# Patient Record
Sex: MALE | Race: BLACK OR AFRICAN AMERICAN | NOT HISPANIC OR LATINO | Employment: OTHER | URBAN - METROPOLITAN AREA
[De-identification: names, ages, dates, MRNs, and addresses within clinical notes are randomized per-mention and may not be internally consistent; named-entity substitution may affect disease eponyms.]

---

## 2018-08-11 ENCOUNTER — HOSPITAL ENCOUNTER (EMERGENCY)
Facility: HOSPITAL | Age: 68
Discharge: HOME | End: 2018-08-11
Attending: EMERGENCY MEDICINE
Payer: COMMERCIAL

## 2018-08-11 VITALS
WEIGHT: 158 LBS | RESPIRATION RATE: 16 BRPM | OXYGEN SATURATION: 99 % | TEMPERATURE: 96.2 F | HEIGHT: 66 IN | DIASTOLIC BLOOD PRESSURE: 96 MMHG | BODY MASS INDEX: 25.39 KG/M2 | HEART RATE: 55 BPM | SYSTOLIC BLOOD PRESSURE: 157 MMHG

## 2018-08-11 DIAGNOSIS — K02.9 PAIN DUE TO DENTAL CARIES: Primary | ICD-10-CM

## 2018-08-11 PROCEDURE — 99282 EMERGENCY DEPT VISIT SF MDM: CPT

## 2018-08-11 RX ORDER — PENICILLIN V POTASSIUM 500 MG/1
500 TABLET ORAL 4 TIMES DAILY
Qty: 40 TABLET | Refills: 0 | Status: SHIPPED | OUTPATIENT
Start: 2018-08-11 | End: 2018-08-21

## 2018-08-11 NOTE — ED NOTES
Pt evaluated, treated, and discharged solely by the advanced provider     Karma Perry RN  08/11/18 3947
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
## 2019-11-21 ENCOUNTER — HOSPITAL ENCOUNTER (EMERGENCY)
Facility: HOSPITAL | Age: 69
Discharge: HOME | End: 2019-11-21
Attending: EMERGENCY MEDICINE | Admitting: EMERGENCY MEDICINE
Payer: COMMERCIAL

## 2019-11-21 ENCOUNTER — APPOINTMENT (EMERGENCY)
Dept: RADIOLOGY | Facility: HOSPITAL | Age: 69
End: 2019-11-21
Payer: COMMERCIAL

## 2019-11-21 VITALS
SYSTOLIC BLOOD PRESSURE: 122 MMHG | BODY MASS INDEX: 26.84 KG/M2 | DIASTOLIC BLOOD PRESSURE: 71 MMHG | HEART RATE: 57 BPM | HEIGHT: 66 IN | WEIGHT: 167 LBS | RESPIRATION RATE: 17 BRPM | TEMPERATURE: 97.5 F | OXYGEN SATURATION: 98 %

## 2019-11-21 DIAGNOSIS — R07.89 ATYPICAL CHEST PAIN: Primary | ICD-10-CM

## 2019-11-21 LAB
ALBUMIN SERPL BCP-MCNC: 3.4 G/DL (ref 3.5–5)
ALP SERPL-CCNC: 60 U/L (ref 46–116)
ALT SERPL W P-5'-P-CCNC: 22 U/L (ref 12–78)
ANION GAP SERPL CALCULATED.3IONS-SCNC: 7 MMOL/L (ref 4–13)
AST SERPL W P-5'-P-CCNC: 20 U/L (ref 5–45)
ATRIAL RATE: 110 BPM
BASOPHILS # BLD AUTO: 0.03 THOUSANDS/ΜL (ref 0–0.1)
BASOPHILS NFR BLD AUTO: 1 % (ref 0–1)
BILIRUB SERPL-MCNC: 0.5 MG/DL (ref 0.2–1)
BUN SERPL-MCNC: 15 MG/DL (ref 5–25)
CALCIUM SERPL-MCNC: 9.1 MG/DL (ref 8.3–10.1)
CHLORIDE SERPL-SCNC: 108 MMOL/L (ref 100–108)
CO2 SERPL-SCNC: 26 MMOL/L (ref 21–32)
CREAT SERPL-MCNC: 1.39 MG/DL (ref 0.6–1.3)
EOSINOPHIL # BLD AUTO: 0.36 THOUSAND/ΜL (ref 0–0.61)
EOSINOPHIL NFR BLD AUTO: 10 % (ref 0–6)
ERYTHROCYTE [DISTWIDTH] IN BLOOD BY AUTOMATED COUNT: 12.7 % (ref 11.6–15.1)
GFR SERPL CREATININE-BSD FRML MDRD: 60 ML/MIN/1.73SQ M
GLUCOSE SERPL-MCNC: 98 MG/DL (ref 65–140)
HCT VFR BLD AUTO: 42.3 % (ref 36.5–49.3)
HGB BLD-MCNC: 13.8 G/DL (ref 12–17)
IMM GRANULOCYTES # BLD AUTO: 0 THOUSAND/UL (ref 0–0.2)
IMM GRANULOCYTES NFR BLD AUTO: 0 % (ref 0–2)
LYMPHOCYTES # BLD AUTO: 1.5 THOUSANDS/ΜL (ref 0.6–4.47)
LYMPHOCYTES NFR BLD AUTO: 43 % (ref 14–44)
MCH RBC QN AUTO: 29.1 PG (ref 26.8–34.3)
MCHC RBC AUTO-ENTMCNC: 32.6 G/DL (ref 31.4–37.4)
MCV RBC AUTO: 89 FL (ref 82–98)
MONOCYTES # BLD AUTO: 0.43 THOUSAND/ΜL (ref 0.17–1.22)
MONOCYTES NFR BLD AUTO: 13 % (ref 4–12)
NEUTROPHILS # BLD AUTO: 1.13 THOUSANDS/ΜL (ref 1.85–7.62)
NEUTS SEG NFR BLD AUTO: 33 % (ref 43–75)
NRBC BLD AUTO-RTO: 0 /100 WBCS
P AXIS: 63 DEGREES
PLATELET # BLD AUTO: 147 THOUSANDS/UL (ref 149–390)
PMV BLD AUTO: 10.1 FL (ref 8.9–12.7)
POTASSIUM SERPL-SCNC: 3.7 MMOL/L (ref 3.5–5.3)
PR INTERVAL: 182 MS
PROT SERPL-MCNC: 6.7 G/DL (ref 6.4–8.2)
QRS AXIS: 18 DEGREES
QRSD INTERVAL: 88 MS
QT INTERVAL: 478 MS
QTC INTERVAL: 653 MS
RBC # BLD AUTO: 4.74 MILLION/UL (ref 3.88–5.62)
SODIUM SERPL-SCNC: 141 MMOL/L (ref 136–145)
T WAVE AXIS: 51 DEGREES
TROPONIN I SERPL-MCNC: <0.02 NG/ML
TROPONIN I SERPL-MCNC: <0.02 NG/ML
VENTRICULAR RATE: 55 BPM
WBC # BLD AUTO: 3.45 THOUSAND/UL (ref 4.31–10.16)

## 2019-11-21 PROCEDURE — 36415 COLL VENOUS BLD VENIPUNCTURE: CPT

## 2019-11-21 PROCEDURE — 85025 COMPLETE CBC W/AUTO DIFF WBC: CPT | Performed by: EMERGENCY MEDICINE

## 2019-11-21 PROCEDURE — 99285 EMERGENCY DEPT VISIT HI MDM: CPT | Performed by: EMERGENCY MEDICINE

## 2019-11-21 PROCEDURE — 93005 ELECTROCARDIOGRAM TRACING: CPT

## 2019-11-21 PROCEDURE — 84484 ASSAY OF TROPONIN QUANT: CPT | Performed by: EMERGENCY MEDICINE

## 2019-11-21 PROCEDURE — 99285 EMERGENCY DEPT VISIT HI MDM: CPT

## 2019-11-21 PROCEDURE — 80053 COMPREHEN METABOLIC PANEL: CPT | Performed by: EMERGENCY MEDICINE

## 2019-11-21 PROCEDURE — 93010 ELECTROCARDIOGRAM REPORT: CPT | Performed by: INTERNAL MEDICINE

## 2019-11-21 PROCEDURE — 71046 X-RAY EXAM CHEST 2 VIEWS: CPT

## 2019-11-21 NOTE — ED PROVIDER NOTES
History  Chief Complaint   Patient presents with    Chest Pain     Pt presents with no complaints at this time  Pt states he had two epidodes of CP/SOB over night and yesterday  Hx: Bypass and stents  82-year-old male with history of coronary artery bypass grafting x6 in 2008 followed by drug eluting stent placed 6 months after CABG  Patient presents today for evaluation of 2 episodes of sharp central chest pain 1 episode at 2030 hours last night which lasted for 10 minutes another episode this morning as he woke up which lasted about 5 minutes  Patient did not have any associated dyspnea, nausea, vomiting, diaphoresis  Pain was nonradiating and nonexertional   Patient states that he was watching TV during the 1st episode and was lying in bed during the 2nd episode  Patient is currently pain-free  Describes the pain as sharp, mild to moderate severity, pinpoint  over the lower sternum  No other complaints at this time  Impression:  Chest pain, atypical   Patient does have known CAD plan:  Cardiac evaluation, 3 hour delta troponin EKG  , will reassess  None       Past Medical History:   Diagnosis Date    Hypercholesterolemia     Hypertension        Past Surgical History:   Procedure Laterality Date    CORONARY ARTERY BYPASS GRAFT         History reviewed  No pertinent family history  I have reviewed and agree with the history as documented  Social History     Tobacco Use    Smoking status: Former Smoker     Types: Cigarettes    Smokeless tobacco: Never Used   Substance Use Topics    Alcohol use: No    Drug use: No        Review of Systems   Constitutional: Negative for diaphoresis and fatigue  HENT: Negative for sore throat  Eyes: Negative for visual disturbance  Respiratory: Negative for cough, shortness of breath, wheezing and stridor  Cardiovascular: Positive for chest pain  Gastrointestinal: Negative for abdominal pain, nausea and vomiting     Genitourinary: Negative for dysuria  Musculoskeletal: Negative for arthralgias, back pain and neck pain  Skin: Negative for pallor, rash and wound  Neurological: Negative for dizziness, weakness, light-headedness, numbness and headaches  Psychiatric/Behavioral: Negative for agitation  All other systems reviewed and are negative  Physical Exam  Physical Exam   Constitutional: He is oriented to person, place, and time  He appears well-developed and well-nourished  No distress  HENT:   Head: Normocephalic and atraumatic  Mouth/Throat: Oropharynx is clear and moist    Eyes: Pupils are equal, round, and reactive to light  Conjunctivae and EOM are normal  Right eye exhibits no discharge  Left eye exhibits no discharge  No scleral icterus  Neck: Normal range of motion  Neck supple  Cardiovascular: Normal rate, regular rhythm, normal heart sounds and intact distal pulses  Exam reveals no gallop and no friction rub  No murmur heard  Pulmonary/Chest: Effort normal and breath sounds normal  No stridor  No respiratory distress  He has no wheezes  Abdominal: Soft  Bowel sounds are normal  He exhibits no distension and no mass  There is no tenderness  There is no rebound and no guarding  Musculoskeletal: Normal range of motion  He exhibits no edema, tenderness or deformity  Neurological: He is alert and oriented to person, place, and time  No sensory deficit  He exhibits normal muscle tone  Skin: Skin is warm  Capillary refill takes less than 2 seconds  No rash noted  He is not diaphoretic  No pallor  Nursing note and vitals reviewed        Vital Signs  ED Triage Vitals [11/21/19 0721]   Temperature Pulse Respirations Blood Pressure SpO2   97 5 °F (36 4 °C) 57 18 135/86 99 %      Temp Source Heart Rate Source Patient Position - Orthostatic VS BP Location FiO2 (%)   Oral Monitor Lying Right arm --      Pain Score       No Pain           Vitals:    11/21/19 0900 11/21/19 1000 11/21/19 1108 11/21/19 1218   BP: 122/74 116/76 126/74 122/71   Pulse: (!) 50 (!) 52 56 57   Patient Position - Orthostatic VS: Lying  Lying Lying         Visual Acuity      ED Medications  Medications - No data to display    Diagnostic Studies  Results Reviewed     Procedure Component Value Units Date/Time    Troponin I [[id number]]  (Normal) Collected:  11/21/19 1141    Lab Status:  Final result Specimen:  Blood from Arm, Right Updated:  11/21/19 1210     Troponin I <0 02 ng/mL     Comprehensive metabolic panel [[id number]]  (Abnormal) Collected:  11/21/19 0745    Lab Status:  Final result Specimen:  Blood from Arm, Right Updated:  11/21/19 0812     Sodium 141 mmol/L      Potassium 3 7 mmol/L      Chloride 108 mmol/L      CO2 26 mmol/L      ANION GAP 7 mmol/L      BUN 15 mg/dL      Creatinine 1 39 mg/dL      Glucose 98 mg/dL      Calcium 9 1 mg/dL      AST 20 U/L      ALT 22 U/L      Alkaline Phosphatase 60 U/L      Total Protein 6 7 g/dL      Albumin 3 4 g/dL      Total Bilirubin 0 50 mg/dL      eGFR 60 ml/min/1 73sq m     Narrative:       Mert guidelines for Chronic Kidney Disease (CKD):     Stage 1 with normal or high GFR (GFR > 90 mL/min/1 73 square meters)    Stage 2 Mild CKD (GFR = 60-89 mL/min/1 73 square meters)    Stage 3A Moderate CKD (GFR = 45-59 mL/min/1 73 square meters)    Stage 3B Moderate CKD (GFR = 30-44 mL/min/1 73 square meters)    Stage 4 Severe CKD (GFR = 15-29 mL/min/1 73 square meters)    Stage 5 End Stage CKD (GFR <15 mL/min/1 73 square meters)  Note: GFR calculation is accurate only with a steady state creatinine    Troponin I [[id number]]  (Normal) Collected:  11/21/19 0745    Lab Status:  Final result Specimen:  Blood from Arm, Right Updated:  11/21/19 0812     Troponin I <0 02 ng/mL     CBC and differential [[id number]]  (Abnormal) Collected:  11/21/19 0745    Lab Status:  Final result Specimen:  Blood from Arm, Right Updated:  11/21/19 0756     WBC 3 45 Thousand/uL      RBC 4 74 Million/uL Hemoglobin 13 8 g/dL      Hematocrit 42 3 %      MCV 89 fL      MCH 29 1 pg      MCHC 32 6 g/dL      RDW 12 7 %      MPV 10 1 fL      Platelets 286 Thousands/uL      nRBC 0 /100 WBCs      Neutrophils Relative 33 %      Immat GRANS % 0 %      Lymphocytes Relative 43 %      Monocytes Relative 13 %      Eosinophils Relative 10 %      Basophils Relative 1 %      Neutrophils Absolute 1 13 Thousands/µL      Immature Grans Absolute 0 00 Thousand/uL      Lymphocytes Absolute 1 50 Thousands/µL      Monocytes Absolute 0 43 Thousand/µL      Eosinophils Absolute 0 36 Thousand/µL      Basophils Absolute 0 03 Thousands/µL                  XR chest 2 views   Final Result by Henrique Coles MD (11/21 9699)      No acute cardiopulmonary disease              Workstation performed: [id number]                    Procedures  ECG 12 Lead Documentation Only  Date/Time: 11/21/2019 8:23 AM  Performed by: Quentin Bear DO  Authorized by: Quentin Bear DO     Indications / Diagnosis:  Chest pain  ECG reviewed by me, the ED Provider: yes    Patient location:  ED  Previous ECG:     Previous ECG:  Unavailable  Interpretation:     Interpretation: normal    Rate:     ECG rate assessment: normal    Rhythm:     Rhythm: sinus rhythm    Ectopy:     Ectopy: none    QRS:     QRS axis:  Normal    QRS intervals:  Normal  Conduction:     Conduction: normal    ST segments:     ST segments:  Normal  T waves:     T waves: normal             ED Course  ED Course as of Nov 21 1412   Thu Nov 21, 2019   0859 Delta @ 1045                -atypical chest pain, reassuring story  -initial and delta ECG+trop unremarkable  -pt pain free  -recommend f/u PMD                   MDM    Disposition  Final diagnoses:   Atypical chest pain     Time reflects when diagnosis was documented in both MDM as applicable and the Disposition within this note     Time User Action Codes Description Comment    11/21/2019 12:17 PM Willam Ascencio Add [R07 89] Atypical chest pain ED Disposition     ED Disposition Condition Date/Time Comment    Discharge Stable Thu Nov 21, 2019 12:17 PM Leonel Gr discharge to home/self care  Follow-up Information     Follow up With Specialties Details Why Toño Lee MD    [address] [facility]  [address] [facility]  [phone]            There are no discharge medications for this patient  No discharge procedures on file      ED Provider  Electronically Signed by           Paola Ascencio DO  11/21/19 7168

## 2019-11-22 LAB
ATRIAL RATE: 49 BPM
P AXIS: 75 DEGREES
PR INTERVAL: 200 MS
QRS AXIS: 15 DEGREES
QRSD INTERVAL: 76 MS
QT INTERVAL: 470 MS
QTC INTERVAL: 424 MS
T WAVE AXIS: 46 DEGREES
VENTRICULAR RATE: 49 BPM

## 2019-11-22 PROCEDURE — 93010 ELECTROCARDIOGRAM REPORT: CPT | Performed by: INTERNAL MEDICINE

## 2020-11-04 ENCOUNTER — VBI (OUTPATIENT)
Dept: ADMINISTRATIVE | Facility: OTHER | Age: 70
End: 2020-11-04

## 2020-11-05 NOTE — DISCHARGE INSTRUCTIONS
Dental Caries   WHAT YOU NEED TO KNOW:   Dental caries are also called cavities  Cavities are caused by bacteria  When the bacteria in tooth plaque (sticky film) mix with certain types of carbohydrate, this creates acid  The acid breaks down areas of enamel, which covers the outside of a tooth  This creates a small hole in the tooth called a cavity  DISCHARGE INSTRUCTIONS:   Return to the emergency department if:   · Your face, jaw, cheek, eye, or neck begin to swell  Contact your dentist if:   · You have a fever  · Your tooth pain gets worse  · You have questions or concerns about your condition or care  Follow up with your dentist as directed:  Write down your questions so you remember to ask them during your visits  Prevent dental caries:   · Brush your teeth at least 2 times a day with fluoride toothpaste  · Use dental floss to clean between your teeth at least once a day  · Rinse your mouth with water or mouthwash after meals and snacks  · Chew sugarless gum after meals and snacks  · See your dentist regularly for dental cleanings and oral exams  © 2017 [address] is for End User's use only and may not be sold, redistributed or otherwise used for commercial purposes  All illustrations and images included in CareNotes® are the copyrighted property of A D [employer] M [employer]  or Andre Fernandez  The above information is an  only  It is not intended as medical advice for individual conditions or treatments  Talk to your doctor, nurse or pharmacist before following any medical regimen to see if it is safe and effective for you 
7.9    12.65 )-----------( 174      ( 05 Nov 2020 20:24 )             27.1       11-05    134<L>  |  104  |  14  ----------------------------<  75  4.1   |  21<L>  |  0.72    Ca    8.4      05 Nov 2020 20:24  Mg     1.7     11-05    TPro  6.7  /  Alb  3.8  /  TBili  0.5  /  DBili  x   /  AST  16  /  ALT  6<L>  /  AlkPhos  33<L>  11-05

## 2021-07-20 ENCOUNTER — VBI (OUTPATIENT)
Dept: ADMINISTRATIVE | Facility: OTHER | Age: 71
End: 2021-07-20

## 2021-09-20 ENCOUNTER — VBI (OUTPATIENT)
Dept: ADMINISTRATIVE | Facility: OTHER | Age: 71
End: 2021-09-20

## 2021-10-20 ENCOUNTER — VBI (OUTPATIENT)
Dept: ADMINISTRATIVE | Facility: OTHER | Age: 71
End: 2021-10-20

## 2021-11-15 ENCOUNTER — VBI (OUTPATIENT)
Dept: ADMINISTRATIVE | Facility: OTHER | Age: 71
End: 2021-11-15

## 2021-11-29 ENCOUNTER — VBI (OUTPATIENT)
Dept: ADMINISTRATIVE | Facility: OTHER | Age: 71
End: 2021-11-29

## 2022-01-06 ENCOUNTER — VBI (OUTPATIENT)
Dept: ADMINISTRATIVE | Facility: OTHER | Age: 72
End: 2022-01-06

## 2022-04-27 ENCOUNTER — VBI (OUTPATIENT)
Dept: ADMINISTRATIVE | Facility: OTHER | Age: 72
End: 2022-04-27

## 2022-06-28 ENCOUNTER — VBI (OUTPATIENT)
Dept: ADMINISTRATIVE | Facility: OTHER | Age: 72
End: 2022-06-28

## 2022-08-17 ENCOUNTER — VBI (OUTPATIENT)
Dept: ADMINISTRATIVE | Facility: OTHER | Age: 72
End: 2022-08-17

## 2022-09-22 ENCOUNTER — VBI (OUTPATIENT)
Dept: ADMINISTRATIVE | Facility: OTHER | Age: 72
End: 2022-09-22

## 2022-11-25 ENCOUNTER — TELEPHONE (OUTPATIENT)
Dept: UROLOGY | Facility: AMBULATORY SURGERY CENTER | Age: 72
End: 2022-11-25

## 2022-11-25 NOTE — TELEPHONE ENCOUNTER
A [facility] referral was just entered into the patient appointment desk for a diagnosis of elevated PSA  The patient needs to be scheduled for a new patient appointment  The referral should soon be scanned into the patient chart for the clincal team to review

## 2022-11-30 ENCOUNTER — VBI (OUTPATIENT)
Dept: ADMINISTRATIVE | Facility: OTHER | Age: 72
End: 2022-11-30

## 2022-12-19 ENCOUNTER — HOSPITAL ENCOUNTER (EMERGENCY)
Facility: HOSPITAL | Age: 72
Discharge: HOME | End: 2022-12-19
Attending: EMERGENCY MEDICINE

## 2022-12-19 ENCOUNTER — APPOINTMENT (EMERGENCY)
Dept: RADIOLOGY | Facility: HOSPITAL | Age: 72
End: 2022-12-19

## 2022-12-19 ENCOUNTER — TELEPHONE (OUTPATIENT)
Dept: UROLOGY | Facility: AMBULATORY SURGERY CENTER | Age: 72
End: 2022-12-19

## 2022-12-19 VITALS
HEIGHT: 66 IN | WEIGHT: 203.04 LBS | TEMPERATURE: 100.5 F | DIASTOLIC BLOOD PRESSURE: 77 MMHG | BODY MASS INDEX: 32.63 KG/M2 | HEART RATE: 64 BPM | OXYGEN SATURATION: 92 % | SYSTOLIC BLOOD PRESSURE: 140 MMHG | RESPIRATION RATE: 20 BRPM

## 2022-12-19 DIAGNOSIS — R50.9 LOW GRADE FEVER: ICD-10-CM

## 2022-12-19 DIAGNOSIS — R33.8 ACUTE URINARY RETENTION: Primary | ICD-10-CM

## 2022-12-19 LAB
ALBUMIN SERPL BCP-MCNC: 4 G/DL (ref 3.5–5)
ALP SERPL-CCNC: 83 U/L (ref 34–104)
ALT SERPL W P-5'-P-CCNC: 17 U/L (ref 7–52)
ANION GAP SERPL CALCULATED.3IONS-SCNC: 6 MMOL/L (ref 4–13)
APTT PPP: 26 SECONDS (ref 23–37)
AST SERPL W P-5'-P-CCNC: 19 U/L (ref 13–39)
BASOPHILS # BLD AUTO: 0.04 THOUSANDS/ÂΜL (ref 0–0.1)
BASOPHILS NFR BLD AUTO: 1 % (ref 0–1)
BILIRUB DIRECT SERPL-MCNC: 0.09 MG/DL (ref 0–0.2)
BILIRUB SERPL-MCNC: 0.49 MG/DL (ref 0.2–1)
BILIRUB UR QL STRIP: NEGATIVE
BUN SERPL-MCNC: 13 MG/DL (ref 5–25)
CALCIUM SERPL-MCNC: 9 MG/DL (ref 8.4–10.2)
CHLORIDE SERPL-SCNC: 103 MMOL/L (ref 96–108)
CLARITY UR: CLEAR
CO2 SERPL-SCNC: 29 MMOL/L (ref 21–32)
COLOR UR: NORMAL
CREAT SERPL-MCNC: 1.43 MG/DL (ref 0.6–1.3)
EOSINOPHIL # BLD AUTO: 0.45 THOUSAND/ÂΜL (ref 0–0.61)
EOSINOPHIL NFR BLD AUTO: 9 % (ref 0–6)
ERYTHROCYTE [DISTWIDTH] IN BLOOD BY AUTOMATED COUNT: 13 % (ref 11.6–15.1)
FLUAV RNA RESP QL NAA+PROBE: NEGATIVE
FLUBV RNA RESP QL NAA+PROBE: NEGATIVE
GFR SERPL CREATININE-BSD FRML MDRD: 49 ML/MIN/1.73SQ M
GLUCOSE SERPL-MCNC: 115 MG/DL (ref 65–140)
GLUCOSE UR STRIP-MCNC: NEGATIVE MG/DL
HCT VFR BLD AUTO: 45.1 % (ref 36.5–49.3)
HGB BLD-MCNC: 15.1 G/DL (ref 12–17)
HGB UR QL STRIP.AUTO: NEGATIVE
IMM GRANULOCYTES # BLD AUTO: 0.01 THOUSAND/UL (ref 0–0.2)
IMM GRANULOCYTES NFR BLD AUTO: 0 % (ref 0–2)
INR PPP: 0.96 (ref 0.84–1.19)
KETONES UR STRIP-MCNC: NEGATIVE MG/DL
LACTATE SERPL-SCNC: 1 MMOL/L (ref 0.5–2)
LEUKOCYTE ESTERASE UR QL STRIP: NEGATIVE
LYMPHOCYTES # BLD AUTO: 0.8 THOUSANDS/ÂΜL (ref 0.6–4.47)
LYMPHOCYTES NFR BLD AUTO: 16 % (ref 14–44)
MCH RBC QN AUTO: 29.2 PG (ref 26.8–34.3)
MCHC RBC AUTO-ENTMCNC: 33.5 G/DL (ref 31.4–37.4)
MCV RBC AUTO: 87 FL (ref 82–98)
MONOCYTES # BLD AUTO: 0.78 THOUSAND/ÂΜL (ref 0.17–1.22)
MONOCYTES NFR BLD AUTO: 16 % (ref 4–12)
NEUTROPHILS # BLD AUTO: 2.79 THOUSANDS/ÂΜL (ref 1.85–7.62)
NEUTS SEG NFR BLD AUTO: 58 % (ref 43–75)
NITRITE UR QL STRIP: NEGATIVE
NRBC BLD AUTO-RTO: 0 /100 WBCS
PH UR STRIP.AUTO: 6.5 [PH]
PLATELET # BLD AUTO: 138 THOUSANDS/UL (ref 149–390)
PMV BLD AUTO: 10.6 FL (ref 8.9–12.7)
POTASSIUM SERPL-SCNC: 3.8 MMOL/L (ref 3.5–5.3)
PROT SERPL-MCNC: 7.1 G/DL (ref 6.4–8.4)
PROT UR STRIP-MCNC: NEGATIVE MG/DL
PROTHROMBIN TIME: 13 SECONDS (ref 11.6–14.5)
RBC # BLD AUTO: 5.17 MILLION/UL (ref 3.88–5.62)
RSV RNA RESP QL NAA+PROBE: NEGATIVE
SARS-COV-2 RNA RESP QL NAA+PROBE: NEGATIVE
SODIUM SERPL-SCNC: 138 MMOL/L (ref 135–147)
SP GR UR STRIP.AUTO: 1.01 (ref 1–1.03)
UROBILINOGEN UR STRIP-ACNC: <2 MG/DL
WBC # BLD AUTO: 4.87 THOUSAND/UL (ref 4.31–10.16)

## 2022-12-19 RX ORDER — LIDOCAINE HYDROCHLORIDE 20 MG/ML
1 JELLY TOPICAL ONCE
Status: COMPLETED | OUTPATIENT
Start: 2022-12-19 | End: 2022-12-19

## 2022-12-19 RX ADMIN — LIDOCAINE HYDROCHLORIDE 1 APPLICATION: 20 JELLY TOPICAL at 06:32

## 2022-12-19 NOTE — ED PROVIDER NOTES
History  Chief Complaint   Patient presents with   • Urinary Retention     Pt presents to the ED with reports of urinary bloating/pressure/bloating gradually worsening over the last 1-2 weeks  Patient is a 81year old male with difficulty urinating for past 1-2 weeks  Last urinated this AM but very little  No fever  No N/V/D  No abdominal surgery  Was told his prostate is enlarged  Was last seen at [facility] ED on 11/21/19 for atypical chest pain  [facility] website checked on this patient and no Rx found  No abdominal pain  History provided by:  Patient   used: No        None       Past Medical History:   Diagnosis Date   • Hypercholesterolemia    • Hypertension        Past Surgical History:   Procedure Laterality Date   • CORONARY ARTERY BYPASS GRAFT         No family history on file  I have reviewed and agree with the history as documented  E-Cigarette/Vaping     E-Cigarette/Vaping Substances     Social History     Tobacco Use   • Smoking status: Former     Types: Cigarettes   • Smokeless tobacco: Never   Substance Use Topics   • Alcohol use: No   • Drug use: No       Review of Systems   Constitutional: Negative for fever  Gastrointestinal: Negative for abdominal pain, diarrhea, nausea and vomiting  Genitourinary: Positive for difficulty urinating  All other systems reviewed and are negative  Physical Exam  Physical Exam  Vitals and nursing note reviewed  Constitutional:       General: He is in acute distress (moderate)  HENT:      Head: Normocephalic and atraumatic  Mouth/Throat:      Mouth: Mucous membranes are moist       Pharynx: Oropharynx is clear  Eyes:      General: No scleral icterus  Cardiovascular:      Rate and Rhythm: Normal rate and regular rhythm  Heart sounds: Normal heart sounds  No murmur heard  Pulmonary:      Effort: Pulmonary effort is normal  No respiratory distress  Breath sounds: Normal breath sounds     Abdominal:      General: Bowel sounds are normal  There is no distension  Palpations: Abdomen is soft  Tenderness: There is no abdominal tenderness  There is no guarding  Musculoskeletal:         General: No deformity  Cervical back: Normal range of motion and neck supple  Right lower leg: No edema  Left lower leg: No edema  Skin:     General: Skin is warm and dry  Findings: No erythema or rash  Neurological:      General: No focal deficit present  Mental Status: He is alert and oriented to person, place, and time  Psychiatric:         Mood and Affect: Mood normal          Vital Signs  ED Triage Vitals [12/19/22 0613]   Temperature Pulse Respirations Blood Pressure SpO2   100 5 °F (38 1 °C) 81 20 150/83 98 %      Temp Source Heart Rate Source Patient Position - Orthostatic VS BP Location FiO2 (%)   Oral Monitor Sitting Right arm --      Pain Score       3           Vitals:    12/19/22 0613 12/19/22 0700   BP: 150/83 140/77   Pulse: 81 64   Patient Position - Orthostatic VS: Sitting          Visual Acuity      ED Medications  Medications   lidocaine (URO-JET) 2 % urethral/mucosal gel 1 application (1 application Urethral Given 12/19/22 2944)       Diagnostic Studies  Results Reviewed     Procedure Component Value Units Date/Time    FLU/RSV/COVID - if FLU/RSV clinically relevant [[id number]]  (Normal) Collected: 12/19/22 0627    Lab Status: Final result Specimen: Nares from Nose Updated: 12/19/22 0731     SARS-CoV-2 Negative     INFLUENZA A PCR Negative     INFLUENZA B PCR Negative     RSV PCR Negative    Narrative:      FOR PEDIATRIC PATIENTS - copy/paste COVID Guidelines URL to browser: [email or web address]  [email or web address]    SARS-CoV-2 assay is a Nucleic Acid Amplification assay intended for the  qualitative detection of nucleic acid from SARS-CoV-2 in nasopharyngeal  swabs  Results are for the presumptive identification of SARS-CoV-2 RNA      Positive results are indicative of infection with SARS-CoV-2, the virus  causing COVID-19, but do not rule out bacterial infection or co-infection  with other viruses  Laboratories within the [place] and its  territories are required to report all positive results to the appropriate  public health authorities  Negative results do not preclude SARS-CoV-2  infection and should not be used as the sole basis for treatment or other  patient management decisions  Negative results must be combined with  clinical observations, patient history, and epidemiological information  This test has not been FDA cleared or approved  This test has been authorized by FDA under an Emergency Use Authorization  (EUA)  This test is only authorized for the duration of time the  declaration that circumstances exist justifying the authorization of the  emergency use of an in vitro diagnostic tests for detection of SARS-CoV-2  virus and/or diagnosis of COVID-19 infection under section 564(b)(1) of  the Act, 21 U  S C  547VVD-0(N)(6), unless the authorization is terminated  or revoked sooner  The test has been validated but independent review by FDA  and CLIA is pending  Test performed using [employer] Gene[employer]pert: This RT-PCR assay targets N2,  a region unique to SARS-CoV-2  A conserved region in the E-gene was chosen  for pan-Sarbecovirus detection which includes SARS-CoV-2  According to CMS-2020-01-R, this platform meets the definition of high-throughput technology      UA w Reflex to Microscopic w Reflex to Culture [[id number]] Collected: 12/19/22 0640    Lab Status: Final result Specimen: Urine, Straight Cath Updated: 12/19/22 0730     Color, UA Light Yellow     Clarity, UA Clear     Specific Gravity, UA 1 013     pH, UA 6 5     Leukocytes, UA Negative     Nitrite, UA Negative     Protein, UA Negative mg/dl      Glucose, UA Negative mg/dl      Ketones, UA Negative mg/dl      Urobilinogen, UA <2 0 mg/dl      Bilirubin, UA Negative     Occult Blood, UA Negative    Basic metabolic panel [[id number]]  (Abnormal) Collected: 12/19/22 0625    Lab Status: Final result Specimen: Blood from Arm, Right Updated: 12/19/22 0705     Sodium 138 mmol/L      Potassium 3 8 mmol/L      Chloride 103 mmol/L      CO2 29 mmol/L      ANION GAP 6 mmol/L      BUN 13 mg/dL      Creatinine 1 43 mg/dL      Glucose 115 mg/dL      Calcium 9 0 mg/dL      eGFR 49 ml/min/1 73sq m     Narrative:      [place] guidelines for Chronic Kidney Disease (CKD):   •  Stage 1 with normal or high GFR (GFR > 90 mL/min/1 73 square meters)  •  Stage 2 Mild CKD (GFR = 60-89 mL/min/1 73 square meters)  •  Stage 3A Moderate CKD (GFR = 45-59 mL/min/1 73 square meters)  •  Stage 3B Moderate CKD (GFR = 30-44 mL/min/1 73 square meters)  •  Stage 4 Severe CKD (GFR = 15-29 mL/min/1 73 square meters)  •  Stage 5 End Stage CKD (GFR <15 mL/min/1 73 square meters)  Note: GFR calculation is accurate only with a steady state creatinine    Protime-INR [[id number]]  (Normal) Collected: 12/19/22 0621    Lab Status: Final result Specimen: Blood from Arm, Right Updated: 12/19/22 0705     Protime 13 0 seconds      INR 0 96    APTT [[id number]]  (Normal) Collected: 12/19/22 0621    Lab Status: Final result Specimen: Blood from Arm, Right Updated: 12/19/22 0705     PTT 26 seconds     Hepatic function panel [[id number]]  (Normal) Collected: 12/19/22 0621    Lab Status: Final result Specimen: Blood from Arm, Right Updated: 12/19/22 0653     Total Bilirubin 0 49 mg/dL      Bilirubin, Direct 0 09 mg/dL      Alkaline Phosphatase 83 U/L      AST 19 U/L      ALT 17 U/L      Total Protein 7 1 g/dL      Albumin 4 0 g/dL     Lactic acid [[id number]]  (Normal) Collected: 12/19/22 7070    Lab Status: Final result Specimen: Blood from Arm, Right Updated: 12/19/22 8744     LACTIC ACID 1 0 mmol/L     Narrative:      Result may be elevated if tourniquet was used during collection      CBC and differential [[id number]] (Abnormal) Collected: 12/19/22 0625    Lab Status: Final result Specimen: Blood from Arm, Right Updated: 12/19/22 0649     WBC 4 87 Thousand/uL      RBC 5 17 Million/uL      Hemoglobin 15 1 g/dL      Hematocrit 45 1 %      MCV 87 fL      MCH 29 2 pg      MCHC 33 5 g/dL      RDW 13 0 %      MPV 10 6 fL      Platelets 869 Thousands/uL      nRBC 0 /100 WBCs      Neutrophils Relative 58 %      Immat GRANS % 0 %      Lymphocytes Relative 16 %      Monocytes Relative 16 %      Eosinophils Relative 9 %      Basophils Relative 1 %      Neutrophils Absolute 2 79 Thousands/µL      Immature Grans Absolute 0 01 Thousand/uL      Lymphocytes Absolute 0 80 Thousands/µL      Monocytes Absolute 0 78 Thousand/µL      Eosinophils Absolute 0 45 Thousand/µL      Basophils Absolute 0 04 Thousands/µL     Blood culture #1 [[id number]] Collected: 12/19/22 6568    Lab Status: In process Specimen: Blood from Arm, Left Updated: 12/19/22 0633    Blood culture #2 [[id number]] Collected: 12/19/22 0621    Lab Status: In process Specimen: Blood from Arm, Right Updated: 12/19/22 0631                 XR chest 1 view portable   ED Interpretation by Tangela Osborne MD (12/19 1845)   FINDINGS:  Midline sternotomy again evident     Cardiomediastinal silhouette appears unremarkable      The lungs are clear  No pneumothorax or pleural effusion      Osseous structures appear within normal limits for patient age      IMPRESSION:  Midline sternotomy     No acute cardiopulmonary disease      Findings are stable           Workstation performed: [id number]      Final Result by Bobo River MD (12/19 2927)   Midline sternotomy      No acute cardiopulmonary disease  Findings are stable            Workstation performed: [id number]                    Procedures  Procedures         ED Course  ED Course as of 12/19/22 0847   Mon Dec 19, 2022   7047 Labs and CXR d/w patient                               Initial Sepsis Screening     Row Name 12/19/22 3554 Is the patient's history suggestive of a new or worsening infection? Yes (Proceed)  -AO        Suspected source of infection urinary tract infection  -AO        Are two or more of the following signs & symptoms of infection both present and new to the patient? No  -AO        Indicate SIRS criteria --        If the answer is yes to both questions, suspicion of sepsis is present --        If severe sepsis is present AND tissue hypoperfusion perists in the hour after fluid resuscitation or lactate > 4, the patient meets criteria for SEPTIC SHOCK --        Are any of the following organ dysfunction criteria present within 6 hours of suspected infection and SIRS criteria that are NOT considered to be chronic conditions? --        Organ dysfunction --        Date of presentation of severe sepsis --        Time of presentation of severe sepsis --        Tissue hypoperfusion persists in the hour after crystalloid fluid administration, evidenced, by either: --        Was hypotension present within one hour of the conclusion of crystalloid fluid administration? --        Date of presentation of septic shock --        Time of presentation of septic shock --              User Key  (r) = Recorded By, (t) = Taken By, (c) = Cosigned By    [facility] [address]  Name Provider Katherine Spangler MD Physician                SBIRT 20yo+    Flowsheet Row Most Recent Value   SBIRT (23 yo +)    In order to provide better care to our patients, we are screening all of our patients for alcohol and drug use  Would it be okay to ask you these screening questions? No Filed at: 12/19/2022 0735                    [facility]  Number of Diagnoses or Management Options  Diagnosis management comments: DDX including but not limited to: urinary retention, reaction to anesthesia, BPH, hematuria, UTI, tumor, neurologic etiology  Doubt pneumonia, covid, influenza, meningitis, cellulitis, meningococcemia          Amount and/or Complexity of Data Reviewed  Clinical lab tests: ordered and reviewed  Tests in the radiology section of CPT®: ordered and reviewed  Decide to obtain previous medical records or to obtain history from someone other than the patient: yes  Review and summarize past medical records: yes  Independent visualization of images, tracings, or specimens: yes        Disposition  Final diagnoses:   Acute urinary retention   Low grade fever     Time reflects when diagnosis was documented in both MDM as applicable and the Disposition within this note     Time User Action Codes Description Comment    12/19/2022  8:36 AM Aga Heath Add [R33 8] Acute urinary retention     12/19/2022  8:36 AM Aga Heath Add [R50 9] Low grade fever       ED Disposition     ED Disposition   Discharge    Condition   Stable    Date/Time   Mon Dec 19, 2022  8:42 AM    Comment   Elfego Silverman discharge to home/self care  Follow-up Information     Follow up With Specialties Details Why Contact Info Additional Jayda Burnham MD  Call in 1 day Return sooner if increased pain, fever, vomiting, diarrhea, difficulty breathing or urinating   [facility] [place] [facility] Call today  [address] [facility] [id number] [phone] [facility] [place], [address] [place] [address], [address] [facility]          Patient's Medications    No medications on file           PDMP Review       Value Time User    PDMP Reviewed  Yes 12/19/2022  6:08 AM Clarisas Sandoval MD          ED Provider  Electronically Signed by           Clarissa Sandoval MD  12/19/22 89

## 2022-12-19 NOTE — TELEPHONE ENCOUNTER
Called and spoke with patient  Advised that we can move up his appointment from 1/19 to 1/6 to establish care and have void trial  Patient had to think about appointment and said he didn't realize that it was so far out  Advised that we were offering to move it up and that the new appointment was only 2 weeks away  He said that if he knew it was going to be this way, he wouldn't have let them put it in  Reinforced that the catheter was placed for a purpose and that we need to ensure he is properly emptying any time there is retention  After thinking it over, patient agreed to sooner appointment  Location, time and day confirmed

## 2022-12-19 NOTE — SEPSIS NOTE
Sepsis Note   Jelena Young 81 y o  male MRN: [id number]  Unit/Bed#: ED-01 Encounter: [id number]       qSOFA     Row Name 12/19/22 5249                Altered mental status GCS < 15 --        Respiratory Rate > / =58 0        Systolic BP < / =038 0        Q Sofa Score 0                   Initial Sepsis Screening     Row Name 12/19/22 0650                Is the patient's history suggestive of a new or worsening infection? Yes (Proceed)  -AO        Suspected source of infection urinary tract infection  -AO        Are two or more of the following signs & symptoms of infection both present and new to the patient? No  -AO        Indicate SIRS criteria --        If the answer is yes to both questions, suspicion of sepsis is present --        If severe sepsis is present AND tissue hypoperfusion perists in the hour after fluid resuscitation or lactate > 4, the patient meets criteria for SEPTIC SHOCK --        Are any of the following organ dysfunction criteria present within 6 hours of suspected infection and SIRS criteria that are NOT considered to be chronic conditions?  --        Organ dysfunction --        Date of presentation of severe sepsis --        Time of presentation of severe sepsis --        Tissue hypoperfusion persists in the hour after crystalloid fluid administration, evidenced, by either: --        Was hypotension present within one hour of the conclusion of crystalloid fluid administration? --        Date of presentation of septic shock --        Time of presentation of septic shock --              User Key  (r) = Recorded By, (t) = Taken By, (c) = Cosigned By    [address] Name Provider Keli Fernandes MD Physician

## 2022-12-19 NOTE — TELEPHONE ENCOUNTER
Please Triage  New Patient    What is the reason for the patient’s appointment? Pt was seen in the Er for urinary retention on 12/19/22  He had there retention for 2 weeks  Pt stated he is feeling better just uncomfortable with the catheter     What office location does the patient prefer? [facility]     Imaging/Lab Results:    Do we accept the patient's insurance or is the patient Self-Pay? Insurance Provider: [employer] Type/Number:  Member ID#: Has the patient had any previous Urologist(s)? Have patient records been requested? If not are records showing in Epic:     Has the patient had any outside testing done? Does the patient have a personal history of cancer?  No     Pt does have an appointment scheduled with us on 01/19/23    Pt can be reached at [phone]

## 2022-12-19 NOTE — ED NOTES
Patient and family education on patients rodriguez  Extra supplies provided to patient  All questions encouraged and answered, patient verbalizes understanding on all discharge instructions        Montez Vega RN  12/19/22 7194

## 2022-12-21 ENCOUNTER — APPOINTMENT (EMERGENCY)
Dept: CT IMAGING | Facility: HOSPITAL | Age: 72
End: 2022-12-21

## 2022-12-21 ENCOUNTER — HOSPITAL ENCOUNTER (EMERGENCY)
Facility: HOSPITAL | Age: 72
Discharge: HOME | End: 2022-12-22
Attending: EMERGENCY MEDICINE

## 2022-12-21 DIAGNOSIS — N30.90 CYSTITIS: ICD-10-CM

## 2022-12-21 DIAGNOSIS — T83.9XXA PROBLEM WITH FOLEY CATHETER, INITIAL ENCOUNTER (HCC): Primary | ICD-10-CM

## 2022-12-21 LAB
ALBUMIN SERPL BCP-MCNC: 3.8 G/DL (ref 3.5–5)
ALP SERPL-CCNC: 72 U/L (ref 34–104)
ALT SERPL W P-5'-P-CCNC: 14 U/L (ref 7–52)
ANION GAP SERPL CALCULATED.3IONS-SCNC: 7 MMOL/L (ref 4–13)
AST SERPL W P-5'-P-CCNC: 22 U/L (ref 13–39)
BACTERIA UR QL AUTO: ABNORMAL /HPF
BASOPHILS # BLD AUTO: 0.02 THOUSANDS/ÂΜL (ref 0–0.1)
BASOPHILS NFR BLD AUTO: 1 % (ref 0–1)
BILIRUB SERPL-MCNC: 0.44 MG/DL (ref 0.2–1)
BILIRUB UR QL STRIP: NEGATIVE
BUN SERPL-MCNC: 13 MG/DL (ref 5–25)
CALCIUM SERPL-MCNC: 9.1 MG/DL (ref 8.4–10.2)
CAOX CRY URNS QL MICRO: ABNORMAL /HPF
CARDIAC TROPONIN I PNL SERPL HS: 4 NG/L
CHLORIDE SERPL-SCNC: 101 MMOL/L (ref 96–108)
CLARITY UR: CLEAR
CO2 SERPL-SCNC: 29 MMOL/L (ref 21–32)
COLOR UR: COLORLESS
CREAT SERPL-MCNC: 1.41 MG/DL (ref 0.6–1.3)
EOSINOPHIL # BLD AUTO: 0.18 THOUSAND/ÂΜL (ref 0–0.61)
EOSINOPHIL NFR BLD AUTO: 6 % (ref 0–6)
ERYTHROCYTE [DISTWIDTH] IN BLOOD BY AUTOMATED COUNT: 13.2 % (ref 11.6–15.1)
GFR SERPL CREATININE-BSD FRML MDRD: 50 ML/MIN/1.73SQ M
GLUCOSE SERPL-MCNC: 103 MG/DL (ref 65–140)
GLUCOSE UR STRIP-MCNC: NEGATIVE MG/DL
HCT VFR BLD AUTO: 44.1 % (ref 36.5–49.3)
HGB BLD-MCNC: 14.4 G/DL (ref 12–17)
HGB UR QL STRIP.AUTO: ABNORMAL
IMM GRANULOCYTES # BLD AUTO: 0 THOUSAND/UL (ref 0–0.2)
IMM GRANULOCYTES NFR BLD AUTO: 0 % (ref 0–2)
KETONES UR STRIP-MCNC: NEGATIVE MG/DL
LEUKOCYTE ESTERASE UR QL STRIP: NEGATIVE
LYMPHOCYTES # BLD AUTO: 1.2 THOUSANDS/ÂΜL (ref 0.6–4.47)
LYMPHOCYTES NFR BLD AUTO: 37 % (ref 14–44)
MCH RBC QN AUTO: 28.5 PG (ref 26.8–34.3)
MCHC RBC AUTO-ENTMCNC: 32.7 G/DL (ref 31.4–37.4)
MCV RBC AUTO: 87 FL (ref 82–98)
MONOCYTES # BLD AUTO: 0.61 THOUSAND/ÂΜL (ref 0.17–1.22)
MONOCYTES NFR BLD AUTO: 19 % (ref 4–12)
NEUTROPHILS # BLD AUTO: 1.26 THOUSANDS/ÂΜL (ref 1.85–7.62)
NEUTS SEG NFR BLD AUTO: 37 % (ref 43–75)
NITRITE UR QL STRIP: NEGATIVE
NON-SQ EPI CELLS URNS QL MICRO: ABNORMAL /HPF
NRBC BLD AUTO-RTO: 0 /100 WBCS
PH UR STRIP.AUTO: 5.5 [PH]
PLATELET # BLD AUTO: 114 THOUSANDS/UL (ref 149–390)
PMV BLD AUTO: 10.3 FL (ref 8.9–12.7)
POTASSIUM SERPL-SCNC: 4.5 MMOL/L (ref 3.5–5.3)
PROT SERPL-MCNC: 6.9 G/DL (ref 6.4–8.4)
PROT UR STRIP-MCNC: NEGATIVE MG/DL
RBC # BLD AUTO: 5.05 MILLION/UL (ref 3.88–5.62)
RBC #/AREA URNS AUTO: ABNORMAL /HPF
SODIUM SERPL-SCNC: 137 MMOL/L (ref 135–147)
SP GR UR STRIP.AUTO: 1 (ref 1–1.03)
UROBILINOGEN UR STRIP-ACNC: <2 MG/DL
WBC # BLD AUTO: 3.27 THOUSAND/UL (ref 4.31–10.16)
WBC #/AREA URNS AUTO: ABNORMAL /HPF

## 2022-12-21 RX ADMIN — IOHEXOL 100 ML: 350 INJECTION, SOLUTION INTRAVENOUS at 22:53

## 2022-12-22 VITALS
TEMPERATURE: 98.2 F | WEIGHT: 187.39 LBS | DIASTOLIC BLOOD PRESSURE: 89 MMHG | RESPIRATION RATE: 18 BRPM | HEART RATE: 67 BPM | BODY MASS INDEX: 30.25 KG/M2 | SYSTOLIC BLOOD PRESSURE: 139 MMHG | OXYGEN SATURATION: 99 %

## 2022-12-22 LAB
2HR DELTA HS TROPONIN: -2 NG/L
CARDIAC TROPONIN I PNL SERPL HS: 2 NG/L

## 2022-12-22 RX ORDER — CEPHALEXIN 250 MG/1
500 CAPSULE ORAL ONCE
Status: DISCONTINUED | OUTPATIENT
Start: 2022-12-22 | End: 2022-12-22

## 2022-12-22 RX ORDER — CEFPODOXIME PROXETIL 200 MG/1
200 TABLET, FILM COATED ORAL 2 TIMES DAILY
Qty: 14 TABLET | Refills: 0 | Status: SHIPPED | OUTPATIENT
Start: 2022-12-22 | End: 2022-12-29

## 2022-12-22 RX ORDER — CEFPODOXIME PROXETIL 200 MG/1
200 TABLET, FILM COATED ORAL ONCE
Status: COMPLETED | OUTPATIENT
Start: 2022-12-22 | End: 2022-12-22

## 2022-12-22 RX ADMIN — CEFPODOXIME PROXETIL 200 MG: 200 TABLET, FILM COATED ORAL at 00:55

## 2022-12-22 NOTE — ED PROVIDER NOTES
History  Chief Complaint   Patient presents with   • Urinary Catheter Problem     Pt states he had urinary catheter placed yesterday, states it has not drained since yesterday  C/o of pressure, burning,  and pulling of catheter  Pt states urine is dark in color, denies blood  Patient is a 79-year-old male presenting with urinary catheter problem patient states that he has concerns of pressure, burning and pulling at the catheter  States that he does not feel he is urinating very much but he has been emptying it  States that his urine is dark in color  Denies any fever, chills, suprapubic pain, flank pain, abdominal pain, chest pain, shortness of breath, diarrhea, constipation, or any other symptoms at this time  Patient states that he has been drinking fluids and by his input and output chart he has drank about 64 ounce of fluid in the last 24 hours  Had about 800 mL of output  Unsure if any blood  Patient states that he is new to having a urinary catheter and has never been comfortable with it  Patient states that he does not think the catheter tubing is long enough and when he walks it pulls  Hx BPH and HTN  Has been taking tamulosin and blood pressure medication  Patient states his heart rate is typically in the high 40s - 50s  Currently in the lower 40s  None       Past Medical History:   Diagnosis Date   • Hypercholesterolemia    • Hypertension        Past Surgical History:   Procedure Laterality Date   • CORONARY ARTERY BYPASS GRAFT         History reviewed  No pertinent family history  I have reviewed and agree with the history as documented  E-Cigarette/Vaping     E-Cigarette/Vaping Substances     Social History     Tobacco Use   • Smoking status: Former     Types: Cigarettes   • Smokeless tobacco: Never   Substance Use Topics   • Alcohol use: No   • Drug use: No       Review of Systems   Constitutional: Negative for chills and fever  HENT: Negative for ear pain and sore throat  Eyes: Negative for pain and visual disturbance  Respiratory: Negative for cough, shortness of breath, wheezing and stridor  Cardiovascular: Negative for chest pain and palpitations  Gastrointestinal: Negative for abdominal pain, constipation, diarrhea, nausea and vomiting  Genitourinary: Positive for difficulty urinating and dysuria  Negative for decreased urine volume, enuresis, flank pain, frequency, genital sores, hematuria, penile discharge, penile pain, penile swelling, scrotal swelling, testicular pain and urgency  Musculoskeletal: Negative for arthralgias and back pain  Skin: Negative for color change and rash  Neurological: Negative for dizziness, seizures, syncope, weakness, numbness and headaches  All other systems reviewed and are negative  Physical Exam  Physical Exam  Vitals and nursing note reviewed  Constitutional:       General: He is not in acute distress  Appearance: He is well-developed  He is not ill-appearing  HENT:      Head: Normocephalic and atraumatic  Eyes:      Conjunctiva/sclera: Conjunctivae normal    Cardiovascular:      Rate and Rhythm: Regular rhythm  Bradycardia present  Pulses: Normal pulses  Heart sounds: Normal heart sounds  No murmur heard  No friction rub  No gallop  Pulmonary:      Effort: Pulmonary effort is normal  No respiratory distress  Breath sounds: Normal breath sounds  No stridor  No wheezing, rhonchi or rales  Chest:      Chest wall: No tenderness  Abdominal:      Palpations: Abdomen is soft  Tenderness: There is no abdominal tenderness  There is no right CVA tenderness or left CVA tenderness  Musculoskeletal:         General: No swelling  Cervical back: Neck supple  Right lower leg: No edema  Left lower leg: No edema  Skin:     General: Skin is warm and dry  Capillary Refill: Capillary refill takes less than 2 seconds  Coloration: Skin is not jaundiced or pale        Findings: No bruising, erythema, lesion or rash  Neurological:      Mental Status: He is alert     Psychiatric:         Mood and Affect: Mood normal          Vital Signs  ED Triage Vitals [12/21/22 1911]   Temperature Pulse Respirations Blood Pressure SpO2   98 2 °F (36 8 °C) (!) 41 19 170/76 97 %      Temp Source Heart Rate Source Patient Position - Orthostatic VS BP Location FiO2 (%)   Oral Monitor Lying Right arm --      Pain Score       5           Vitals:    12/21/22 1911 12/21/22 2100 12/22/22 0000   BP: 170/76 143/71 139/89   Pulse: (!) 41 74 67   Patient Position - Orthostatic VS: Lying Lying Lying         Visual Acuity      ED Medications  Medications   iohexol (OMNIPAQUE) 350 MG/ML injection (SINGLE-DOSE) 100 mL (100 mL Intravenous Given 12/21/22 2253)   cefpodoxime (VANTIN) tablet 200 mg (200 mg Oral Given 12/22/22 0055)       Diagnostic Studies  Results Reviewed     Procedure Component Value Units Date/Time    Urine culture [[id number]] Collected: 12/21/22 2101    Lab Status: Final result Specimen: Urine, Indwelling Hobbs Catheter Updated: 12/23/22 0946     Urine Culture No Growth <1000 cfu/mL    HS Troponin I 2hr [[id number]]  (Normal) Collected: 12/21/22 2322    Lab Status: Final result Specimen: Blood from Arm, Right Updated: 12/22/22 0032     hs TnI 2hr 2 ng/L      Delta 2hr hsTnI -2 ng/L     Urine Microscopic [[id number]]  (Abnormal) Collected: 12/21/22 2101    Lab Status: Final result Specimen: Urine, Indwelling Hobbs Catheter Updated: 12/21/22 2115     RBC, UA 2-4 /hpf      WBC, UA 1-2 /hpf      Epithelial Cells Occasional /hpf      Bacteria, UA Occasional /hpf      Ca Oxalate Brittney, UA Occasional /hpf     UA w Reflex to Microscopic w Reflex to Culture [[id number]]  (Abnormal) Collected: 12/21/22 2101    Lab Status: Final result Specimen: Urine, Indwelling Hobbs Catheter Updated: 12/21/22 2113     Color, UA Colorless     Clarity, UA Clear     Specific [place], UA 1 003     pH, UA 5 5     Leukocytes, UA Negative Nitrite, UA Negative     Protein, UA Negative mg/dl      Glucose, UA Negative mg/dl      Ketones, UA Negative mg/dl      Urobilinogen, UA <2 0 mg/dl      Bilirubin, UA Negative     Occult Blood, UA Large    HS Troponin 0hr (reflex protocol) [[id number]]  (Normal) Collected: 12/21/22 2040    Lab Status: Final result Specimen: Blood from Arm, Right Updated: 12/21/22 2112     hs TnI 0hr 4 ng/L     Comprehensive metabolic panel [[id number]]  (Abnormal) Collected: 12/21/22 2040    Lab Status: Final result Specimen: Blood from Arm, Right Updated: 12/21/22 2107     Sodium 137 mmol/L      Potassium 4 5 mmol/L      Chloride 101 mmol/L      CO2 29 mmol/L      ANION GAP 7 mmol/L      BUN 13 mg/dL      Creatinine 1 41 mg/dL      Glucose 103 mg/dL      Calcium 9 1 mg/dL      AST 22 U/L      ALT 14 U/L      Alkaline Phosphatase 72 U/L      Total Protein 6 9 g/dL      Albumin 3 8 g/dL      Total Bilirubin 0 44 mg/dL      eGFR 50 ml/min/1 73sq m     Narrative:      [facility] guidelines for Chronic Kidney Disease (CKD):   •  Stage 1 with normal or high GFR (GFR > 90 mL/min/1 73 square meters)  •  Stage 2 Mild CKD (GFR = 60-89 mL/min/1 73 square meters)  •  Stage 3A Moderate CKD (GFR = 45-59 mL/min/1 73 square meters)  •  Stage 3B Moderate CKD (GFR = 30-44 mL/min/1 73 square meters)  •  Stage 4 Severe CKD (GFR = 15-29 mL/min/1 73 square meters)  •  Stage 5 End Stage CKD (GFR <15 mL/min/1 73 square meters)  Note: GFR calculation is accurate only with a steady state creatinine    CBC and differential [[id number]]  (Abnormal) Collected: 12/21/22 2040    Lab Status: Final result Specimen: Blood from Arm, Right Updated: 12/21/22 2053     WBC 3 27 Thousand/uL      RBC 5 05 Million/uL      Hemoglobin 14 4 g/dL      Hematocrit 44 1 %      MCV 87 fL      MCH 28 5 pg      MCHC 32 7 g/dL      RDW 13 2 %      MPV 10 3 fL      Platelets 863 Thousands/uL      nRBC 0 /100 WBCs      Neutrophils Relative 37 %      Immat GRANS % 0 %      Lymphocytes Relative 37 %      Monocytes Relative 19 %      Eosinophils Relative 6 %      Basophils Relative 1 %      Neutrophils Absolute 1 26 Thousands/µL      Immature Grans Absolute 0 00 Thousand/uL      Lymphocytes Absolute 1 20 Thousands/µL      Monocytes Absolute 0 61 Thousand/µL      Eosinophils Absolute 0 18 Thousand/µL      Basophils Absolute 0 02 Thousands/µL                  CT abdomen pelvis with contrast   Final Result by Shea Screen, DO (12/22 0011)      Catheter seen within the bladder lumen  Bladder is partially distended with marked thickening of the bladder wall,  consider a cystitis in the appropriate clinical setting  Correlation with the patient's symptoms, laboratory values, and urinalysis    recommended  Underlying bladder lesion not excluded  Urology consult and/or cystoscopy may be of benefit for further evaluation at the discretion of the referring caregiver  Left renal atrophy and scarring  Coronary atherosclerosis, small hiatal hernia suspected, and other findings as above        Workstation performed: [id number]                    Procedures  ECG 12 Lead Documentation Only    Date/Time: 12/27/2022 4:34 PM  Performed by: Paco Alcala PA-C  Authorized by: Paco Alcala PA-C     Indications / Diagnosis:  Slow heart rate  ECG reviewed by me, the ED Provider: yes    Patient location:  ED  Previous ECG:     Previous ECG:  Compared to current    Comparison ECG info:  2019    Similarity:  Changes noted    Comparison to cardiac monitor: No    Interpretation:     Interpretation: abnormal    Quality:     Tracing quality:  Limited by artifact  Rate:     ECG rate:  59    ECG rate assessment: bradycardic    Rhythm:     Rhythm: sinus rhythm    Ectopy:     Ectopy: bigeminy    QRS:     QRS axis:  Left    QRS intervals:  Normal  Conduction:     Conduction: normal    ST segments:     ST segments:  Normal  T waves:     T waves: normal    Q waves:     Q waves: II             ED Course                                             MDM  Number of Diagnoses or Management Options  Diagnosis management comments: 79-year-old male presenting with dysuria and decreased urine output  Patient states that he is still having some output in draining the bag but came today because he is having dysuria and pain with urination  Denies any flank pain or abdominal pain  States that his urine seems darker than usual     Patient has a urinary catheter due to having BPH  Patient has surgery scheduled for January and this catheter was placed due to acute urinary retention  Will bladder scan  Attempt to break up any obstructions in the urinary catheter  Patient is having urine flow into his bag  Patient states that his heart rate is always in the high 40s to low 50s  Patient has had an increase in blood pressure medication namely the tamsulosin which he has been taking for his prostate  Patient denies any dizziness, palpitations or chest pain at this time  Patient complaining that the catheter seems to be pulling when he walks due to the tube not being long enough  CBC, CMP, Urine  Pushed fluid through the catheter, no issues  Began draining  No obstructions  Bladder scan prior 11 ml  Disposition  Final diagnoses:   Problem with Hobbs catheter, initial encounter [facility])   Cystitis     Time reflects when diagnosis was documented in both MDM as applicable and the Disposition within this note     Time User Action Codes Description Comment    12/22/2022 12:25 AM Lewayne Leventhal Add [T83  9XXA] [facility] with Hobbs catheter[facility]     12/22/2022 12:25 AM Lewayne Leventhal Add [N30 90] Cystitis       ED Disposition     ED Disposition   Discharge    Condition   Stable    Date/Time   Thu Dec 22, 2022 12:25 AM    Comment   Judie Fernandez discharge to home/self care                 Follow-up Information     Follow up With Specialties Details Why Contact Info Additional Zaynab Pfeiffer MD  Schedule an appointment as soon as possible for a visit   [address]        [place] [address] Emergency Department Emergency Medicine  If symptoms worsen [address] [facility] [address] [facility] Emergency Department, [address], [place], [address], [address] [facility] OS Urology Schedule an appointment as soon as possible for a visit   [address] [id number]  701  [facility], 500 [facility], [address] [facility], 169 [facility]          Discharge Medication List as of 12/22/2022 12:30 AM      START taking these medications    Details   cefpodoxime (VANTIN) 200 mg tablet Take 1 tablet (200 mg total) by mouth 2 (two) times a day for 7 days, Starting Thu 12/22/2022, Until Thu 12/29/2022, Print             No discharge procedures on file      PDMP Review       Value Time User    PDMP Reviewed  Yes 12/19/2022  6:08 AM Maryellen Moreno MD          ED Provider  Electronically Signed by           Radha Orellana PA-C  12/21/22 2106       Radha Orellana PA-C  12/27/22 0510

## 2022-12-22 NOTE — ED CARE HANDOFF
Emergency Department Sign Out Note        Sign out and [place] [facility]  See Separate Emergency Department note  The patient, Liz Manley, was evaluated by the previous provider for urinary catheter pain  Workup Completed:  History and physical, labs with interpretation    ED Course / Workup Pending (followup):  U/a results                  Procedures  MDM  Number of Diagnoses or Management Options  Cystitis: new and requires workup  Problem with Hobbs catheter, initial encounter [facility]): new and requires workup     Amount and/or Complexity of Data Reviewed  Clinical lab tests: reviewed  Tests in the radiology section of CPT®: ordered and reviewed            Disposition  Final diagnoses:   Problem with Hobbs catheter, initial encounter [facility])   Cystitis     Time reflects when diagnosis was documented in both MDM as applicable and the Disposition within this note     Time User Action Codes Description Comment    12/22/2022 12:25 AM Tess Cost Add [T83  9XXA] Problem with Hobbs catheter, initial encounter ([address] )     12/22/2022 12:25 AM Tess Cost Add [N30 90] Cystitis       ED Disposition     ED Disposition   Discharge    Condition   Stable    Date/Time   u Dec 22, 2022 12:25 AM    Comment   Liz Manley discharge to home/self care                 Follow-up Information     Follow up With Specialties Details Why Contact Info Additional Information    Laxmi Fiore MD  Schedule an appointment as soon as possible for a visit   [address] [place], [place] [id number] [address] [place] [address] Emergency Department Emergency Medicine  If symptoms worsen [address] [facility] [id number] [place] [address] Emergency Department,  [address], [place], [place], [address] [facility] [place] Urology Schedule an appointment as soon as possible for a visit   [facility], [place]  [address] [facility] [address]  701  [facility], [address] [place], [facility] [place], [place], [address] [facility]        Discharge Medication List as of 12/22/2022 12:30 AM      START taking these medications    Details   cefpodoxime (VANTIN) 200 mg tablet Take 1 tablet (200 mg total) by mouth 2 (two) times a day for 7 days, Starting Thu 12/22/2022, Until Thu 12/29/2022, Print           No discharge procedures on file         ED Provider  Electronically Signed by     Vahid Howe PA-C  12/22/22 8538

## 2022-12-23 LAB
ATRIAL RATE: 59 BPM
BACTERIA UR CULT: NORMAL
P AXIS: -71 DEGREES
PR INTERVAL: 200 MS
QRS AXIS: -82 DEGREES
QRSD INTERVAL: 160 MS
QT INTERVAL: 428 MS
QTC INTERVAL: 423 MS
T WAVE AXIS: 88 DEGREES
VENTRICULAR RATE: 59 BPM

## 2022-12-24 LAB
BACTERIA BLD CULT: NORMAL
BACTERIA BLD CULT: NORMAL

## 2022-12-31 ENCOUNTER — APPOINTMENT (EMERGENCY)
Dept: RADIOLOGY | Facility: HOSPITAL | Age: 72
End: 2022-12-31

## 2022-12-31 ENCOUNTER — HOSPITAL ENCOUNTER (INPATIENT)
Facility: HOSPITAL | Age: 72
LOS: 5 days | Discharge: HOME | End: 2023-01-05
Attending: EMERGENCY MEDICINE | Admitting: INTERNAL MEDICINE

## 2022-12-31 ENCOUNTER — APPOINTMENT (EMERGENCY)
Dept: CT IMAGING | Facility: HOSPITAL | Age: 72
End: 2022-12-31

## 2022-12-31 DIAGNOSIS — N32.3 BLADDER DIVERTICULUM: ICD-10-CM

## 2022-12-31 DIAGNOSIS — R33.8 ACUTE URINARY RETENTION: ICD-10-CM

## 2022-12-31 DIAGNOSIS — N13.8 BENIGN PROSTATIC HYPERPLASIA WITH URINARY OBSTRUCTION: ICD-10-CM

## 2022-12-31 DIAGNOSIS — N40.1 BENIGN PROSTATIC HYPERPLASIA WITH URINARY OBSTRUCTION: ICD-10-CM

## 2022-12-31 DIAGNOSIS — N30.90 CYSTITIS: Primary | ICD-10-CM

## 2022-12-31 PROBLEM — I25.10 CORONARY ARTERY DISEASE INVOLVING NATIVE CORONARY ARTERY OF NATIVE HEART WITHOUT ANGINA PECTORIS: Status: ACTIVE | Noted: 2022-12-31

## 2022-12-31 PROBLEM — N40.0 BPH (BENIGN PROSTATIC HYPERPLASIA): Status: ACTIVE | Noted: 2022-12-31

## 2022-12-31 PROBLEM — E78.5 HLD (HYPERLIPIDEMIA): Status: ACTIVE | Noted: 2022-12-31

## 2022-12-31 PROBLEM — I10 HTN (HYPERTENSION): Status: ACTIVE | Noted: 2022-12-31

## 2022-12-31 PROBLEM — N39.0 SEPSIS DUE TO GRAM-NEGATIVE UTI (HCC): Status: ACTIVE | Noted: 2022-12-31

## 2022-12-31 PROBLEM — A40.9 SEPSIS DUE TO STREPTOCOCCUS SPECIES WITHOUT ACUTE ORGAN DYSFUNCTION (HCC): Status: ACTIVE | Noted: 2022-12-31

## 2022-12-31 PROBLEM — N18.31 STAGE 3A CHRONIC KIDNEY DISEASE (HCC): Status: ACTIVE | Noted: 2022-12-31

## 2022-12-31 PROBLEM — A41.50 SEPSIS DUE TO GRAM-NEGATIVE UTI (HCC): Status: ACTIVE | Noted: 2022-12-31

## 2022-12-31 LAB
2HR DELTA HS TROPONIN: 1 NG/L
ALBUMIN SERPL BCP-MCNC: 3 G/DL (ref 3.5–5)
ALBUMIN SERPL BCP-MCNC: 3.8 G/DL (ref 3.5–5)
ALP SERPL-CCNC: 60 U/L (ref 34–104)
ALP SERPL-CCNC: 76 U/L (ref 34–104)
ALT SERPL W P-5'-P-CCNC: 10 U/L (ref 7–52)
ALT SERPL W P-5'-P-CCNC: 14 U/L (ref 7–52)
ANION GAP SERPL CALCULATED.3IONS-SCNC: 13 MMOL/L (ref 4–13)
ANION GAP SERPL CALCULATED.3IONS-SCNC: 9 MMOL/L (ref 4–13)
APTT PPP: 29 SECONDS (ref 23–37)
AST SERPL W P-5'-P-CCNC: 15 U/L (ref 13–39)
AST SERPL W P-5'-P-CCNC: 19 U/L (ref 13–39)
ATRIAL RATE: 81 BPM
BACTERIA UR QL AUTO: ABNORMAL /HPF
BASOPHILS # BLD AUTO: 0.05 THOUSANDS/ÂΜL (ref 0–0.1)
BASOPHILS NFR BLD AUTO: 0 % (ref 0–1)
BILIRUB SERPL-MCNC: 0.72 MG/DL (ref 0.2–1)
BILIRUB SERPL-MCNC: 0.8 MG/DL (ref 0.2–1)
BILIRUB UR QL STRIP: NEGATIVE
BNP SERPL-MCNC: 357 PG/ML (ref 0–100)
BUN SERPL-MCNC: 12 MG/DL (ref 5–25)
BUN SERPL-MCNC: 13 MG/DL (ref 5–25)
CALCIUM ALBUM COR SERPL-MCNC: 8.9 MG/DL (ref 8.3–10.1)
CALCIUM SERPL-MCNC: 8.1 MG/DL (ref 8.4–10.2)
CALCIUM SERPL-MCNC: 9.1 MG/DL (ref 8.4–10.2)
CARDIAC TROPONIN I PNL SERPL HS: 27 NG/L
CARDIAC TROPONIN I PNL SERPL HS: 28 NG/L
CHLORIDE SERPL-SCNC: 104 MMOL/L (ref 96–108)
CHLORIDE SERPL-SCNC: 105 MMOL/L (ref 96–108)
CLARITY UR: ABNORMAL
CO2 SERPL-SCNC: 22 MMOL/L (ref 21–32)
CO2 SERPL-SCNC: 25 MMOL/L (ref 21–32)
COLOR UR: ABNORMAL
CREAT SERPL-MCNC: 1.32 MG/DL (ref 0.6–1.3)
CREAT SERPL-MCNC: 1.35 MG/DL (ref 0.6–1.3)
EOSINOPHIL # BLD AUTO: 0.01 THOUSAND/ÂΜL (ref 0–0.61)
EOSINOPHIL NFR BLD AUTO: 0 % (ref 0–6)
ERYTHROCYTE [DISTWIDTH] IN BLOOD BY AUTOMATED COUNT: 12.8 % (ref 11.6–15.1)
ERYTHROCYTE [DISTWIDTH] IN BLOOD BY AUTOMATED COUNT: 12.9 % (ref 11.6–15.1)
GFR SERPL CREATININE-BSD FRML MDRD: 52 ML/MIN/1.73SQ M
GFR SERPL CREATININE-BSD FRML MDRD: 54 ML/MIN/1.73SQ M
GLUCOSE SERPL-MCNC: 111 MG/DL (ref 65–140)
GLUCOSE SERPL-MCNC: 122 MG/DL (ref 65–140)
GLUCOSE UR STRIP-MCNC: NEGATIVE MG/DL
HCT VFR BLD AUTO: 37 % (ref 36.5–49.3)
HCT VFR BLD AUTO: 42.3 % (ref 36.5–49.3)
HGB BLD-MCNC: 12.5 G/DL (ref 12–17)
HGB BLD-MCNC: 14.7 G/DL (ref 12–17)
HGB UR QL STRIP.AUTO: ABNORMAL
IMM GRANULOCYTES # BLD AUTO: 0.18 THOUSAND/UL (ref 0–0.2)
IMM GRANULOCYTES NFR BLD AUTO: 1 % (ref 0–2)
INR PPP: 1.5 (ref 0.84–1.19)
KETONES UR STRIP-MCNC: NEGATIVE MG/DL
LACTATE SERPL-SCNC: 1.4 MMOL/L (ref 0.5–2)
LEUKOCYTE ESTERASE UR QL STRIP: ABNORMAL
LYMPHOCYTES # BLD AUTO: 0.9 THOUSANDS/ÂΜL (ref 0.6–4.47)
LYMPHOCYTES NFR BLD AUTO: 5 % (ref 14–44)
MCH RBC QN AUTO: 29.1 PG (ref 26.8–34.3)
MCH RBC QN AUTO: 29.7 PG (ref 26.8–34.3)
MCHC RBC AUTO-ENTMCNC: 33.8 G/DL (ref 31.4–37.4)
MCHC RBC AUTO-ENTMCNC: 34.8 G/DL (ref 31.4–37.4)
MCV RBC AUTO: 86 FL (ref 82–98)
MCV RBC AUTO: 86 FL (ref 82–98)
MONOCYTES # BLD AUTO: 1.13 THOUSAND/ÂΜL (ref 0.17–1.22)
MONOCYTES NFR BLD AUTO: 7 % (ref 4–12)
MUCOUS THREADS UR QL AUTO: ABNORMAL
NEUTROPHILS # BLD AUTO: 14.63 THOUSANDS/ÂΜL (ref 1.85–7.62)
NEUTS SEG NFR BLD AUTO: 87 % (ref 43–75)
NITRITE UR QL STRIP: NEGATIVE
NON-SQ EPI CELLS URNS QL MICRO: ABNORMAL /HPF
NRBC BLD AUTO-RTO: 0 /100 WBCS
P AXIS: 54 DEGREES
PH UR STRIP.AUTO: 7.5 [PH]
PLATELET # BLD AUTO: 182 THOUSANDS/UL (ref 149–390)
PLATELET # BLD AUTO: 217 THOUSANDS/UL (ref 149–390)
PMV BLD AUTO: 10.2 FL (ref 8.9–12.7)
PMV BLD AUTO: 9.9 FL (ref 8.9–12.7)
POTASSIUM SERPL-SCNC: 3.7 MMOL/L (ref 3.5–5.3)
POTASSIUM SERPL-SCNC: 4 MMOL/L (ref 3.5–5.3)
PR INTERVAL: 164 MS
PROCALCITONIN SERPL-MCNC: 4.39 NG/ML
PROT SERPL-MCNC: 5.4 G/DL (ref 6.4–8.4)
PROT SERPL-MCNC: 6.9 G/DL (ref 6.4–8.4)
PROT UR STRIP-MCNC: ABNORMAL MG/DL
PROTHROMBIN TIME: 18.3 SECONDS (ref 11.6–14.5)
QRS AXIS: -9 DEGREES
QRSD INTERVAL: 88 MS
QT INTERVAL: 400 MS
QTC INTERVAL: 464 MS
RBC # BLD AUTO: 4.3 MILLION/UL (ref 3.88–5.62)
RBC # BLD AUTO: 4.95 MILLION/UL (ref 3.88–5.62)
RBC #/AREA URNS AUTO: ABNORMAL /HPF
SODIUM SERPL-SCNC: 139 MMOL/L (ref 135–147)
SODIUM SERPL-SCNC: 139 MMOL/L (ref 135–147)
SP GR UR STRIP.AUTO: 1.04 (ref 1–1.03)
T WAVE AXIS: -45 DEGREES
UROBILINOGEN UR STRIP-ACNC: <2 MG/DL
VENTRICULAR RATE: 81 BPM
WBC # BLD AUTO: 13.71 THOUSAND/UL (ref 4.31–10.16)
WBC # BLD AUTO: 16.9 THOUSAND/UL (ref 4.31–10.16)
WBC #/AREA URNS AUTO: ABNORMAL /HPF

## 2022-12-31 RX ORDER — CARVEDILOL 3.12 MG/1
3.12 TABLET ORAL 2 TIMES DAILY WITH MEALS
Status: DISCONTINUED | OUTPATIENT
Start: 2022-12-31 | End: 2023-01-05 | Stop reason: HOSPADM

## 2022-12-31 RX ORDER — CARVEDILOL 6.25 MG/1
3.12 TABLET ORAL
COMMUNITY
Start: 2022-08-29

## 2022-12-31 RX ORDER — SODIUM CHLORIDE, SODIUM GLUCONATE, SODIUM ACETATE, POTASSIUM CHLORIDE, MAGNESIUM CHLORIDE, SODIUM PHOSPHATE, DIBASIC, AND POTASSIUM PHOSPHATE .53; .5; .37; .037; .03; .012; .00082 G/100ML; G/100ML; G/100ML; G/100ML; G/100ML; G/100ML; G/100ML
500 INJECTION, SOLUTION INTRAVENOUS ONCE
Status: COMPLETED | OUTPATIENT
Start: 2022-12-31 | End: 2022-12-31

## 2022-12-31 RX ORDER — LANOLIN ALCOHOL/MO/W.PET/CERES
1000 CREAM (GRAM) TOPICAL
COMMUNITY
Start: 2022-08-29

## 2022-12-31 RX ORDER — HYDROMORPHONE HCL/PF 1 MG/ML
0.5 SYRINGE (ML) INJECTION ONCE
Status: COMPLETED | OUTPATIENT
Start: 2022-12-31 | End: 2022-12-31

## 2022-12-31 RX ORDER — ASPIRIN 81 MG/1
81 TABLET ORAL DAILY
COMMUNITY

## 2022-12-31 RX ORDER — FINASTERIDE 5 MG/1
5 TABLET, FILM COATED ORAL DAILY
Status: DISCONTINUED | OUTPATIENT
Start: 2022-12-31 | End: 2023-01-05 | Stop reason: HOSPADM

## 2022-12-31 RX ORDER — ENOXAPARIN SODIUM 100 MG/ML
40 INJECTION SUBCUTANEOUS DAILY
Status: DISCONTINUED | OUTPATIENT
Start: 2022-12-31 | End: 2023-01-05 | Stop reason: HOSPADM

## 2022-12-31 RX ORDER — SODIUM CHLORIDE 9 MG/ML
100 INJECTION, SOLUTION INTRAVENOUS CONTINUOUS
Status: DISCONTINUED | OUTPATIENT
Start: 2022-12-31 | End: 2023-01-03

## 2022-12-31 RX ORDER — ACETAMINOPHEN 325 MG/1
975 TABLET ORAL ONCE
Status: COMPLETED | OUTPATIENT
Start: 2022-12-31 | End: 2022-12-31

## 2022-12-31 RX ORDER — PRAVASTATIN SODIUM 40 MG
60 TABLET ORAL
COMMUNITY
Start: 2022-08-29

## 2022-12-31 RX ORDER — ONDANSETRON 2 MG/ML
4 INJECTION INTRAMUSCULAR; INTRAVENOUS ONCE
Status: COMPLETED | OUTPATIENT
Start: 2022-12-31 | End: 2022-12-31

## 2022-12-31 RX ORDER — ASPIRIN 81 MG/1
81 TABLET ORAL DAILY
Status: DISCONTINUED | OUTPATIENT
Start: 2022-12-31 | End: 2023-01-05 | Stop reason: HOSPADM

## 2022-12-31 RX ORDER — ACETAMINOPHEN 325 MG/1
650 TABLET ORAL EVERY 6 HOURS PRN
Status: DISCONTINUED | OUTPATIENT
Start: 2022-12-31 | End: 2023-01-05 | Stop reason: HOSPADM

## 2022-12-31 RX ORDER — CEPHALEXIN 500 MG/1
500 CAPSULE ORAL 3 TIMES DAILY
COMMUNITY
Start: 2022-12-30 | End: 2023-01-05

## 2022-12-31 RX ORDER — TAMSULOSIN HYDROCHLORIDE 0.4 MG/1
0.4 CAPSULE ORAL
Status: DISCONTINUED | OUTPATIENT
Start: 2022-12-31 | End: 2023-01-05 | Stop reason: HOSPADM

## 2022-12-31 RX ADMIN — ASPIRIN 81 MG: 81 TABLET, COATED ORAL at 08:46

## 2022-12-31 RX ADMIN — FINASTERIDE 5 MG: 5 TABLET, FILM COATED ORAL at 08:46

## 2022-12-31 RX ADMIN — SODIUM CHLORIDE 100 ML/HR: 0.9 INJECTION, SOLUTION INTRAVENOUS at 09:41

## 2022-12-31 RX ADMIN — HYDROMORPHONE HYDROCHLORIDE 0.5 MG: 1 INJECTION, SOLUTION INTRAMUSCULAR; INTRAVENOUS; SUBCUTANEOUS at 01:26

## 2022-12-31 RX ADMIN — PIPERACILLIN AND TAZOBACTAM 3.38 G: 36; 4.5 INJECTION, POWDER, FOR SOLUTION INTRAVENOUS at 08:54

## 2022-12-31 RX ADMIN — PIPERACILLIN AND TAZOBACTAM 3.38 G: 36; 4.5 INJECTION, POWDER, FOR SOLUTION INTRAVENOUS at 20:34

## 2022-12-31 RX ADMIN — PIPERACILLIN AND TAZOBACTAM 3.38 G: 36; 4.5 INJECTION, POWDER, FOR SOLUTION INTRAVENOUS at 15:36

## 2022-12-31 RX ADMIN — PIPERACILLIN SODIUM AND TAZOBACTAM SODIUM 3.38 G: 36; 4.5 INJECTION, POWDER, LYOPHILIZED, FOR SOLUTION INTRAVENOUS at 03:30

## 2022-12-31 RX ADMIN — IOHEXOL 100 ML: 350 INJECTION, SOLUTION INTRAVENOUS at 02:39

## 2022-12-31 RX ADMIN — ACETAMINOPHEN 650 MG: 325 TABLET ORAL at 20:33

## 2022-12-31 RX ADMIN — SODIUM CHLORIDE 100 ML/HR: 0.9 INJECTION, SOLUTION INTRAVENOUS at 20:34

## 2022-12-31 RX ADMIN — TAMSULOSIN HYDROCHLORIDE 0.4 MG: 0.4 CAPSULE ORAL at 15:36

## 2022-12-31 RX ADMIN — SODIUM CHLORIDE, SODIUM GLUCONATE, SODIUM ACETATE, POTASSIUM CHLORIDE, MAGNESIUM CHLORIDE, SODIUM PHOSPHATE, DIBASIC, AND POTASSIUM PHOSPHATE 500 ML: .53; .5; .37; .037; .03; .012; .00082 INJECTION, SOLUTION INTRAVENOUS at 01:25

## 2022-12-31 RX ADMIN — ENOXAPARIN SODIUM 40 MG: 40 INJECTION SUBCUTANEOUS at 08:46

## 2022-12-31 RX ADMIN — PRAVASTATIN SODIUM 60 MG: 40 TABLET ORAL at 15:36

## 2022-12-31 RX ADMIN — ACETAMINOPHEN 975 MG: 325 TABLET ORAL at 01:26

## 2022-12-31 RX ADMIN — ONDANSETRON 4 MG: 2 INJECTION INTRAMUSCULAR; INTRAVENOUS at 01:26

## 2022-12-31 NOTE — CONSULTS
UROLOGY CONSULTATION NOTE     Patient Identifiers: Blanca Hernández (MRN [id number])  Service Requesting Consultation: Mehdi Marcos MD    Service Providing Consultation:  Urology, Lashaun Mosqueda    Date of Service: 12/31/2022  Inpatient consult to Urology  Consult performed by: CRIS Mosqueda  Consult ordered by: Belkis Elmore PA-C          Reason for Consultation: UTI    ASSESSMENT:     81 y o  old male with  BPH, urinary retention without hydronephrosis or upper tract obstruction and recent UTI with treatment  PLAN:   · Continue medical optimization  · Monitor voiding with serial PVRs  Insert Hobbs catheter for volumes exceeding 450 mL  · Resume Flomax and Proscar  Patient was instructed not to interrupt therapy  · Will require cystoscopic examination and transrectal ultrasound for further diagnostic testing and to determine appropriateness of potential future prostatic ablative procedure and surgical approach  · Discussed at length, there is potential for catheter reinsertion and if so, would recommend Hobbs catheter at the time of discharge until follow-up  · No indication for emergent/urgent/acute  surgical intervention this hospital stay  · Service will contact patient/caregiver with hospital follow-up appointment date and time once discharged  History of Present Illness:     Blanca Hernández is a 81 y o  old with a history of BPH managed by PCP in [address]  Patient describes progressive and worsening obstructive lower urinary tract symptoms including nocturia, stranguria and severe urinary hesitancy particularly in the a m  hours  Patient was prescribed Flomax and Proscar until recently patient decided to self discontinued after requesting referral to outside urologist to establish care for comprehensive BPH work-up  Patient noted complete urinary obstruction and presented to [facility] emergency room 12/21/2022    He was scheduled with [facility] urology at the [place] [address] office 1/6  However patient then presented to [address] [facility] with fever  He was discharged from the emergency room with a course of Keflex and represented early morning hours to [facility] emergency room with lethargy, low-grade temps and hypotension  He was admitted to the internal medicine service  Lab work revealed leukocytosis with initial white count exceeding 16,000, trending downward to 13 K  Serum creatinine at baseline between 1 3--1 4  Patient with known history of stage IIIa CKD  Urine analysis was negative for bacteria on microscopic  Dip showed large amounts of leukocytes without nitrites  Procalcitonin level slightly elevated at 4 39  Blood cultures pending  CT of the abdomen and pelvis demonstrated bilaterally decompressed upper tracts  The left renal unit was atrophic with areas of scarring  No nephroureterolithiasis intrarenally or obstructing, perinephric stranding, perinephric bleeding, phlegmon, abscess or discrete fluid collections, bladder hematoma or bladder calculi  There was evidence of a large diverticulum noted near the dome and evidence of focus of air due to recent urinary catheterization  No suspicious lymphadenopathy or osseous lesions appreciated on CT examination  Patient demanded Hobbs catheter removal in the ER  Last previous  mL    Urologic consultation requested for further management recommendations      Past Medical, Past Surgical History:     Past Medical History:   Diagnosis Date   • BPH (benign prostatic hyperplasia)    • Hx of CABG    • Hypercholesterolemia    • Hypertension    :    Past Surgical History:   Procedure Laterality Date   • CORONARY ARTERY BYPASS GRAFT     :    Medications, Allergies:     Current Facility-Administered Medications   Medication Dose Route Frequency   • aspirin (ECOTRIN LOW STRENGTH) EC tablet 81 mg  81 mg Oral Daily   • carvedilol (COREG) tablet 3 125 mg  3 125 mg Oral BID With Meals   • enoxaparin (LOVENOX) subcutaneous injection 40 mg  40 mg Subcutaneous Daily   • finasteride (PROSCAR) tablet 5 mg  5 mg Oral Daily   • piperacillin-tazobactam (ZOSYN) 3 375 g in sodium chloride 0 9 % 100 mL IVPB  3 375 g Intravenous Q6H   • pravastatin (PRAVACHOL) tablet 60 mg  60 mg Oral Daily With Dinner   • tamsulosin (FLOMAX) capsule 0 4 mg  0 4 mg Oral Daily With Dinner       Allergies: Allergies   Allergen Reactions   • Dulcolax [Bisacodyl] Hives   • Latex    • Levofloxacin    :    Social and Family History:   Social History:   Social History     Tobacco Use   • Smoking status: Former     Types: Cigarettes   • Smokeless tobacco: Never   Substance Use Topics   • Alcohol use: No   • Drug use: No        Social History     Tobacco Use   Smoking Status Former   • Types: Cigarettes   Smokeless Tobacco Never       Family History:  History reviewed  No pertinent family history :     Review of Systems:     Review of Systems - History obtained from chart review and the patient  General ROS: positive for  - chills, fatigue and fever  Respiratory ROS: no cough, shortness of breath, or wheezing  Cardiovascular ROS: no chest pain or dyspnea on exertion  Gastrointestinal ROS: no abdominal pain, change in bowel habits, or black or bloody stools  Low back pain/flank pain  Genito-Urinary ROS: positive for - change in urinary stream and nocturia  negative for - dysuria or hematuria  Neurological ROS: no TIA or stroke symptoms    All other systems queried were negative  Physical Exam:   General: Patient is pleasant and in NAD  Awake and alert  BP (!) 84/41 (BP Location: Left arm)   Pulse 73   Temp 99 1 °F (37 3 °C) (Oral)   Resp 16   SpO2 94% Temp (24hrs), Av 9 °F (37 7 °C), Min:99 1 °F (37 3 °C), Max:101 4 °F (38 6 °C)  current; Temperature: 99 1 °F (37 3 °C)  I/O last 24 hours:   In: -   Out: 48 [Urine:50]    General appearance: alert and oriented, in no acute distress, appears stated age, cooperative and no distress  Head: Normocephalic, without obvious abnormality, atraumatic  Lungs: diminished breath sounds  Heart: regular rate and rhythm, S1, S2 normal, no murmur, click, rub or gallop  Abdomen: soft, non-tender; bowel sounds normal; no masses,  no organomegaly  Extremities: extremities normal, warm and well-perfused; no cyanosis, clubbing, or edema  Pulses: 2+ and symmetric  Neurologic: Grossly normal   Hobbs removed  Labs:     Lab Results   Component Value Date    HGB 12 5 12/31/2022    HCT 37 0 12/31/2022    WBC 13 71 (H) 12/31/2022     12/31/2022   ]    Lab Results   Component Value Date    K 3 7 12/31/2022     12/31/2022    CO2 25 12/31/2022    BUN 13 12/31/2022    CREATININE 1 35 (H) 12/31/2022    CALCIUM 8 1 (L) 12/31/2022   ]    Imaging:   I personally reviewed the images and report of the following studies, and reviewed them with the patient:    CT Abdomen: see belw      CT chest abdomen pelvis w contrast [[id number]] Collected: 12/31/22 0247   Order Status: Completed Updated: 12/31/22 0258   Narrative:     CT CHEST, ABDOMEN AND PELVIS WITH IV CONTRAST     INDICATION:   Sepsis   fever, cough, SOB, low back pain, recent UTI with cystitis, evaluating for infectious source      WBCs ~17     COMPARISON:  CT 12/21/22     TECHNIQUE: CT examination of the chest, abdomen and pelvis was performed  Axial, sagittal, and coronal 2D reformatted images were created from the source data and submitted for interpretation  Radiation dose length product (DLP) for this visit:  163 mGy-cm    This examination, like all CT scans performed in the [facility], was performed utilizing techniques to minimize radiation dose exposure, including the use of iterative   reconstruction and automated exposure control  IV Contrast:  100 mL of iohexol (OMNIPAQUE)   Enteric Contrast: Enteric contrast was administered  FINDINGS:     CHEST     LUNGS:  Lungs are clear   There is no tracheal or endobronchial lesion  PLEURA:  Unremarkable  HEART/GREAT VESSELS: Heart is unremarkable for patient's age   No thoracic aortic aneurysm  MEDIASTINUM AND KATIE:  Unremarkable  CHEST WALL AND LOWER NECK:  Sternotomy noted     ABDOMEN     LIVER/BILIARY TREE:  Unremarkable  GALLBLADDER:  No calcified gallstones  No pericholecystic inflammatory change  SPLEEN:  Unremarkable  PANCREAS:  Unremarkable  ADRENAL GLANDS:  Unremarkable  KIDNEYS/URETERS:  Left kidney is atrophic with areas of scarring     STOMACH AND BOWEL:  Unremarkable  APPENDIX:  No findings to suggest appendicitis  ABDOMINOPELVIC CAVITY:  No ascites   No pneumoperitoneum   No lymphadenopathy  VESSELS:  Unremarkable for patient's age  PELVIS     REPRODUCTIVE ORGANS:  Unremarkable for patient's age  URINARY BLADDER:  Decompressed, however is significantly thick-walled   Large diverticulum noted near the dome, which contains focus of air     ABDOMINAL WALL/INGUINAL REGIONS:  Unremarkable  OSSEOUS STRUCTURES:  No acute fracture or destructive osseous lesion  Impression:       1   Thick-walled urinary bladder consistent with cystitis   Air within the lumen may be achievable to recent catheterization or gas forming infection  2   Large bladder diverticulum         Workstation performed: [id number]          Thank you for allowing me to participate in this patients’ care  Please do not hesitate to call with any additional questions    CRIS Yeager

## 2022-12-31 NOTE — ED PROVIDER NOTES
History  Chief Complaint   Patient presents with   • Flank Pain     Left sided flank pain  Patient received a rodriguez on a recent admission  Was seen at [facility] today and was told he has a kidney infection  Mr Elroy Samuel is a 81 yom with a history of BPH, recently diagnosed with urinary retention on 12/19 with rodriguez insertion, then with acute cystitis on 12/21 and treated with 7 day course of cefpodoxime, who presents with severe bilateral lower back pain(aching sensation, non-radiating) and hip pain(aching sensation) for the past few days  Notes onset of discomfort after rodriguez catheter was placed, however this has become intolerable in the last 24 hours  Also notes bilateral lower extremity swelling x 1 week, fever, chills, cough, and mild shortness of breath yesterday, however he states the shortness of breath was due to him being in severe pain  Was seen at [facility], started on keflex for UTI, however specimen was obtained from Rodriguez bag  Denies headache, lightheadedness, congestion, sore throat, chest pain, current shortness of breath, abdominal pain, flank pain, nausea, vomiting, diarrhea, constipation, tenesmus, rectal bleeding, rectal pain, blood in stool, penile discharge, penile swelling, penile irritation, testicular pain or swelling, or unilateral calf pain or swelling  Is not immunocompromised, does not have history of back injuries, does not have diabetes, has remote history of throat cancer in remission  Has history of prior CABG, no history of CHF, no prior episodes of leg swelling  Is requesting immediate rodriguez catheter removal           Prior to Admission Medications   Prescriptions Last Dose Informant Patient Reported?  Taking?   aspirin (ECOTRIN LOW STRENGTH) 81 mg EC tablet 12/30/2022  Yes Yes   Sig: Take 81 mg by mouth daily   carvedilol (COREG) 6 25 mg tablet 12/30/2022  Yes Yes   Sig: 3 125 mg   cephalexin (KEFLEX) 500 mg capsule 12/30/2022  Yes Yes   Sig: Take 500 mg by mouth Three times a day   niacin (SLO-NIACIN) 500 mg tablet 2022  Yes Yes   Si,000 mg   pravastatin (PRAVACHOL) 40 mg tablet 2022  Yes Yes   Si mg      Facility-Administered Medications: None       Past Medical History:   Diagnosis Date   • BPH (benign prostatic hyperplasia)    • Hx of CABG    • Hypercholesterolemia    • Hypertension        Past Surgical History:   Procedure Laterality Date   • CORONARY ARTERY BYPASS GRAFT         History reviewed  No pertinent family history  I have reviewed and agree with the history as documented  E-Cigarette/Vaping     E-Cigarette/Vaping Substances     Social History     Tobacco Use   • Smoking status: Former     Types: Cigarettes   • Smokeless tobacco: Never   Substance Use Topics   • Alcohol use: No   • Drug use: No        Review of Systems   Constitutional: Positive for chills, fatigue and fever  Negative for diaphoresis  HENT: Negative  Negative for congestion, postnasal drip, rhinorrhea, sneezing and sore throat  Eyes: Negative  Respiratory: Negative for cough and shortness of breath  Shortness of breath yesterday, not currently present  Cardiovascular: Positive for leg swelling  Negative for chest pain and palpitations  Gastrointestinal: Negative  Negative for abdominal distention, abdominal pain, anal bleeding, blood in stool, constipation, diarrhea, nausea, rectal pain and vomiting  Endocrine: Negative  Genitourinary: Negative for flank pain, genital sores, hematuria, penile discharge, penile pain, penile swelling, scrotal swelling and testicular pain  Musculoskeletal: Positive for back pain and myalgias  Negative for gait problem, joint swelling, neck pain and neck stiffness  Skin: Negative  Negative for pallor and rash  Allergic/Immunologic: Negative  Negative for immunocompromised state  Neurological: Negative  Negative for dizziness, syncope, weakness, light-headedness and headaches  Hematological: Negative    Negative for adenopathy  Does not bruise/bleed easily  Psychiatric/Behavioral: Negative  Negative for confusion  All other systems reviewed and are negative  Physical Exam  ED Triage Vitals [12/31/22 0021]   Temperature Pulse Respirations Blood Pressure SpO2   (!) 101 4 °F (38 6 °C) 63 20 150/93 97 %      Temp Source Heart Rate Source Patient Position - Orthostatic VS BP Location FiO2 (%)   Oral Monitor Sitting Right arm --      Pain Score       10 - Worst Possible Pain             Orthostatic Vital Signs  Vitals:    12/31/22 0021 12/31/22 0700   BP: 150/93 (!) 84/41   Pulse: 63 73   Patient Position - Orthostatic VS: Sitting Lying       Physical Exam  Vitals and nursing note reviewed  Constitutional:       General: He is in acute distress  Appearance: He is ill-appearing  Comments: Inconsolable, pacing around the room 2/2 pain  HENT:      Head: Normocephalic and atraumatic  Right Ear: External ear normal       Left Ear: External ear normal       Nose: Nose normal  No congestion or rhinorrhea  Mouth/Throat:      Mouth: Mucous membranes are dry  Pharynx: Oropharynx is clear  No oropharyngeal exudate or posterior oropharyngeal erythema  Eyes:      General:         Right eye: No discharge  Left eye: No discharge  Extraocular Movements: Extraocular movements intact  Conjunctiva/sclera: Conjunctivae normal    Cardiovascular:      Rate and Rhythm: Normal rate and regular rhythm  Pulses: Normal pulses  Heart sounds: Normal heart sounds  No murmur heard  No friction rub  No gallop  Pulmonary:      Effort: Pulmonary effort is normal  No respiratory distress  Breath sounds: Normal breath sounds  No stridor  No wheezing, rhonchi or rales  Chest:      Chest wall: No tenderness  Abdominal:      General: Abdomen is flat  Bowel sounds are normal  There is no distension  Palpations: Abdomen is soft  There is no mass  Tenderness:  There is no abdominal tenderness  There is no right CVA tenderness, left CVA tenderness, guarding or rebound  Hernia: No hernia is present  Musculoskeletal:         General: No swelling or tenderness  Normal range of motion  Cervical back: Normal, normal range of motion and neck supple  No rigidity, spasms, tenderness or bony tenderness  Thoracic back: Normal  No spasms or bony tenderness  Normal range of motion  Lumbar back: Normal  No spasms or bony tenderness  Normal range of motion  Negative right straight leg raise test and negative left straight leg raise test       Right hip: Normal  No deformity, tenderness or bony tenderness  Normal range of motion  Left hip: Normal  No deformity, tenderness or bony tenderness  Normal range of motion  Right lower le+ Pitting Edema present  Left lower le+ Pitting Edema present  Comments: Unable to reproduce bilateral low back pain or hip pain with palpation or range of motion  Lymphadenopathy:      Cervical: No cervical adenopathy  Skin:     General: Skin is warm and dry  Capillary Refill: Capillary refill takes less than 2 seconds  Coloration: Skin is not jaundiced or pale  Findings: No bruising, erythema or rash  Neurological:      General: No focal deficit present  Mental Status: He is alert  Sensory: No sensory deficit  Motor: No weakness        Gait: Gait normal          ED Medications  Medications   aspirin (ECOTRIN LOW STRENGTH) EC tablet 81 mg (has no administration in time range)   pravastatin (PRAVACHOL) tablet 60 mg (has no administration in time range)   carvedilol (COREG) tablet 3 125 mg (has no administration in time range)   enoxaparin (LOVENOX) subcutaneous injection 40 mg (has no administration in time range)   piperacillin-tazobactam (ZOSYN) 3 375 g in sodium chloride 0 9 % 100 mL IVPB (has no administration in time range)   tamsulosin (FLOMAX) capsule 0 4 mg (has no administration in time range)   finasteride (PROSCAR) tablet 5 mg (has no administration in time range)   multi-electrolyte (ISOLYTE-S PH 7 4) bolus 500 mL (0 mL Intravenous Stopped 12/31/22 0225)   acetaminophen (TYLENOL) tablet 975 mg (975 mg Oral Given 12/31/22 0126)   HYDROmorphone (DILAUDID) injection 0 5 mg (0 5 mg Intravenous Given 12/31/22 0126)   ondansetron (ZOFRAN) injection 4 mg (4 mg Intravenous Given 12/31/22 0126)   iohexol (OMNIPAQUE) 350 MG/ML injection (SINGLE-DOSE) 100 mL (100 mL Intravenous Given 12/31/22 0239)   piperacillin-tazobactam (ZOSYN) 3 375 g in sodium chloride 0 9 % 100 mL IVPB (0 g Intravenous Stopped 12/31/22 0407)       Diagnostic Studies  Results Reviewed     Procedure Component Value Units Date/Time    Blood culture #1 [[id number]] Collected: 12/31/22 0120    Lab Status: Preliminary result Specimen: Blood from Hand, Right Updated: 12/31/22 0801     Blood Culture Received in Microbiology Lab  Culture in Progress  Blood culture #2 [[id number]] Collected: 12/31/22 0105    Lab Status: Preliminary result Specimen: Blood from Arm, Left Updated: 12/31/22 0801     Blood Culture Received in Microbiology Lab  Culture in Progress      Procalcitonin [[id number]]  (Abnormal) Collected: 12/31/22 0105    Lab Status: Final result Specimen: Blood from Arm, Left Updated: 12/31/22 0749     Procalcitonin 4 39 ng/ml     HS Troponin I 2hr [[id number]]  (Normal) Collected: 12/31/22 0316    Lab Status: Final result Specimen: Blood from Arm, Left Updated: 12/31/22 0358     hs TnI 2hr 28 ng/L      Delta 2hr hsTnI 1 ng/L     B-Type Natriuretic Peptide(BNP), AN, CA, EA Campuses Only [[id number]]  (Abnormal) Collected: 12/31/22 0105    Lab Status: Final result Specimen: Blood from Arm, Left Updated: 12/31/22 0356      pg/mL     APTT [[id number]]  (Normal) Collected: 12/31/22 0105    Lab Status: Final result Specimen: Blood from Arm, Left Updated: 12/31/22 0200     PTT 29 seconds     Protime-INR [[id number]]  (Abnormal) Collected: 12/31/22 0105    Lab Status: Final result Specimen: Blood from Arm, Left Updated: 12/31/22 0200     Protime 18 3 seconds      INR 1 50    HS Troponin 0hr (reflex protocol) [[id number]]  (Normal) Collected: 12/31/22 0105    Lab Status: Final result Specimen: Blood from Arm, Left Updated: 12/31/22 0158     hs TnI 0hr 27 ng/L     Comprehensive metabolic panel [[id number]]  (Abnormal) Collected: 12/31/22 0105    Lab Status: Final result Specimen: Blood from Arm, Left Updated: 12/31/22 0153     Sodium 139 mmol/L      Potassium 4 0 mmol/L      Chloride 104 mmol/L      CO2 22 mmol/L      ANION GAP 13 mmol/L      BUN 12 mg/dL      Creatinine 1 32 mg/dL      Glucose 122 mg/dL      Calcium 9 1 mg/dL      AST 19 U/L      ALT 14 U/L      Alkaline Phosphatase 76 U/L      Total Protein 6 9 g/dL      Albumin 3 8 g/dL      Total Bilirubin 0 72 mg/dL      eGFR 54 ml/min/1 73sq m     Narrative:      [place] guidelines for Chronic Kidney Disease (CKD):   •  Stage 1 with normal or high GFR (GFR > 90 mL/min/1 73 square meters)  •  Stage 2 Mild CKD (GFR = 60-89 mL/min/1 73 square meters)  •  Stage 3A Moderate CKD (GFR = 45-59 mL/min/1 73 square meters)  •  Stage 3B Moderate CKD (GFR = 30-44 mL/min/1 73 square meters)  •  Stage 4 Severe CKD (GFR = 15-29 mL/min/1 73 square meters)  •  Stage 5 End Stage CKD (GFR <15 mL/min/1 73 square meters)  Note: GFR calculation is accurate only with a steady state creatinine    Lactic acid [[id number]]  (Normal) Collected: 12/31/22 0105    Lab Status: Final result Specimen: Blood from Arm, Left Updated: 12/31/22 0151     LACTIC ACID 1 4 mmol/L     Narrative:      Result may be elevated if tourniquet was used during collection      CBC and differential [[id number]]  (Abnormal) Collected: 12/31/22 0105    Lab Status: Final result Specimen: Blood from Arm, Left Updated: 12/31/22 0130     WBC 16 90 Thousand/uL      RBC 4 95 Million/uL      Hemoglobin 14 7 g/dL Hematocrit 42 3 %      MCV 86 fL      MCH 29 7 pg      MCHC 34 8 g/dL      RDW 12 8 %      MPV 10 2 fL      Platelets 211 Thousands/uL      nRBC 0 /100 WBCs      Neutrophils Relative 87 %      Immat GRANS % 1 %      Lymphocytes Relative 5 %      Monocytes Relative 7 %      Eosinophils Relative 0 %      Basophils Relative 0 %      Neutrophils Absolute 14 63 Thousands/µL      Immature Grans Absolute 0 18 Thousand/uL      Lymphocytes Absolute 0 90 Thousands/µL      Monocytes Absolute 1 13 Thousand/µL      Eosinophils Absolute 0 01 Thousand/µL      Basophils Absolute 0 05 Thousands/µL     UA w Reflex to Microscopic w Reflex to Culture [[id number]]     Lab Status: No result Specimen: Urine, Clean Catch                  CT chest abdomen pelvis w contrast   Final Result by Isidro Mcgowan MD (12/31 0257)      1  Thick-walled urinary bladder consistent with cystitis  Air within the lumen may be achievable to recent catheterization or gas forming infection     2   Large bladder diverticulum            Workstation performed: [id number]         XR chest 1 view portable    (Results Pending)         Procedures  ECG 12 Lead Documentation Only    Date/Time: 12/31/2022 1:12 AM  Performed by: Tri Hawk DO  Authorized by: Tri Hawk DO     Indications / Diagnosis:  Triage  Patient location:  ED  Previous ECG:     Previous ECG:  Compared to current    Comparison ECG info:  12/21/2022    Similarity:  Changes noted  Interpretation:     Interpretation: non-specific    Rate:     ECG rate:  81    ECG rate assessment: normal    Rhythm:     Rhythm: sinus rhythm    Ectopy:     Ectopy: PVCs      PVCs:  Frequent  QRS:     QRS axis:  Normal    QRS intervals:  Normal  Conduction:     Conduction: normal    ST segments:     ST segments:  Normal  T waves:     T waves: inverted      Inverted:  II, III, aVR, aVF, V4, V5 and V6          ED Course  ED Course as of 12/31/22 0813   Sat Dec 31, 2022   0131 WBC(!): 16 90   0142 Spoke with patient and family at bedside  Pt endorses mild cough for the past two days, episode of shortness of breath yesterday, also bilateral lower extremity edema x 1 week  FLU/COVID/RSV negative yesterday per chart review  Will obtain BNP, CXR     0200 HS Troponin I 2hr   0200 Pt placed on supplemental oxygen given room air SpO2 91-92%  Improved on 2L    0209 POCT INR(!): 1 50  New from prior, however not >1 50    0258 IMPRESSION:     1  Thick-walled urinary bladder consistent with cystitis  Air within the lumen may be achievable to recent catheterization or gas forming infection  2   Large bladder diverticulum  0303 Pt with continued evidence of cystitis on CT, new bladder diverticulum despite recent cefpodoxime therapy  Will start IV zosyn, admit for UTI with outpatient abx failure  Initial Sepsis Screening     Row Name 12/31/22 0208                Is the patient's history suggestive of a new or worsening infection? --        Suspected source of infection suspect infection, source unknown  -AW        Are two or more of the following signs & symptoms of infection both present and new to the patient? --        Indicate SIRS criteria WBC > 10% bands; Hyperthemia > 38 3C (100 9F)  -AW        If the answer is yes to both questions, suspicion of sepsis is present --        If severe sepsis is present AND tissue hypoperfusion perists in the hour after fluid resuscitation or lactate > 4, the patient meets criteria for SEPTIC SHOCK --        Are any of the following organ dysfunction criteria present within 6 hours of suspected infection and SIRS criteria that are NOT considered to be chronic conditions?  No  -AW        Organ dysfunction --        Date of presentation of severe sepsis --        Time of presentation of severe sepsis --        Tissue hypoperfusion persists in the hour after crystalloid fluid administration, evidenced, by either: --        Was hypotension present within one hour of the conclusion of crystalloid fluid administration? --        Date of presentation of septic shock --        Time of presentation of septic shock --              User Key  (r) = Recorded By, (t) = Taken By, (c) = Cosigned By    [address] Name Provider Type    GIOVANY Batres DO Resident                SBIRT 20yo+    Flowsheet Row Most Recent Value   SBIRT (25 yo +)    In order to provide better care to our patients, we are screening all of our patients for alcohol and drug use  Would it be okay to ask you these screening questions? Unable to answer at this time Filed at: 12/31/2022 0032                MDM  Number of Diagnoses or Management Options  Bladder diverticulum  Cystitis  Diagnosis management comments: Pt presents in acute distress 2/2 pain which he attributes to rodriguez catheter placement, requests immediate removal   Risks/benefits/alternatives thoroughly discussed with patient, however he declined pain medication, wishes to have it removed despite risks of continued retention  Rodriguez removed with immediate significant relief of pain, however did have some mild residual pain  Is febrile, VS otherwise stable  Given recent history, concern exists for URI, pneumonia, heart failure, UTI, and other intra-abdominal infections  No midline spinal tenderness, no red flag symptoms or risk factors for spinal epidural abscess, discitis, cauda equina, or conus medullaris  Doubt acute osseous abnormalities of the spine and hips based on physical exam, lack of risk factors for fragility fractures and no history of trauma  No evidence of pneumonia on exam    Given small fluid bolus given dry oral mucosa with improvement in objective hydration  Did not give additional fluids as he does have BLE edema  See ED course for MDM          Amount and/or Complexity of Data Reviewed  Clinical lab tests: ordered and reviewed  Tests in the radiology section of CPT®: ordered and reviewed  Review and summarize past medical records: yes  Discuss the patient with other providers: yes  Independent visualization of images, tracings, or specimens: yes        Disposition  Final diagnoses:   Cystitis   Bladder diverticulum     Time reflects when diagnosis was documented in both MDM as applicable and the Disposition within this note     Time User Action Codes Description Comment    12/31/2022  3:27 AM Fina Short Add [N30 90] Cystitis     12/31/2022  3:28 AM Fina Short Add [N32 3] Bladder diverticulum     12/31/2022  4:09 AM Vee Gilinda Add [R33 8] Acute urinary retention       ED Disposition     ED Disposition   Admit    Condition   Stable    Date/Time   Sat Dec 31, 2022  3:27 AM    Comment   Case was discussed with CHIKIS Brooks, and the patient's admission status was agreed to be Admission Status: inpatient status to the service of Dr Shreya Gomes   Follow-up Information    None         Current Discharge Medication List      CONTINUE these medications which have NOT CHANGED    Details   aspirin (ECOTRIN LOW STRENGTH) 81 mg EC tablet Take 81 mg by mouth daily      carvedilol (COREG) 6 25 mg tablet 3 125 mg      cephalexin (KEFLEX) 500 mg capsule Take 500 mg by mouth Three times a day      niacin (SLO-NIACIN) 500 mg tablet 1,000 mg      pravastatin (PRAVACHOL) 40 mg tablet 60 mg           No discharge procedures on file  PDMP Review       Value Time User    PDMP Reviewed  Yes 12/19/2022  6:08 AM Rory Montemayor MD           ED Provider  Attending physically available and evaluated Tad Roper  PHILLIP managed the patient along with the ED Attending      Electronically Signed by         Anitra Welch DO  12/31/22 5337

## 2022-12-31 NOTE — PLAN OF CARE
Problem: Potential for Falls  Goal: Patient will remain free of falls  Description: INTERVENTIONS:  - Educate patient/family on patient safety including physical limitations  - Instruct patient to call for assistance with activity   - Consult OT/PT to assist with strengthening/mobility   - Keep Call bell within reach  - Keep bed low and locked with side rails adjusted as appropriate  - Keep care items and personal belongings within reach  - Initiate and maintain comfort rounds  - Make Fall Risk Sign visible to staff  - Offer Toileting every 2 Hours, in advance of need  - Initiate/Maintain bed alarm  - Obtain necessary fall risk management equipment  - Apply yellow socks and bracelet for high fall risk patients  - Consider moving patient to room near nurses station  Outcome: Progressing     Problem: MOBILITY - ADULT  Goal: Maintain or return to baseline ADL function  Description: INTERVENTIONS:  -  Assess patient's ability to carry out ADLs; assess patient's baseline for ADL function and identify physical deficits which impact ability to perform ADLs (bathing, care of mouth/teeth, toileting, grooming, dressing, etc )  - Assess/evaluate cause of self-care deficits   - Assess range of motion  - Assess patient's mobility; develop plan if impaired  - Assess patient's need for assistive devices and provide as appropriate  - Encourage maximum independence but intervene and supervise when necessary  - Involve family in performance of ADLs  - Assess for home care needs following discharge   - Consider OT consult to assist with ADL evaluation and planning for discharge  - Provide patient education as appropriate  Outcome: Progressing  Goal: Maintains/Returns to pre admission functional level  Description: INTERVENTIONS:  - Perform BMAT or MOVE assessment daily    - Set and communicate daily mobility goal to care team and patient/family/caregiver     - Collaborate with rehabilitation services on mobility goals if consulted  - Perform Range of Motion 2 times a day  - Reposition patient every 2 hours    - Dangle patient 2 times a day  - Stand patient 2 times a day  - Ambulate patient 3 times a day  - Out of bed to chair 3 times a day   - Out of bed for meals 3 times a day  - Out of bed for toileting  - Record patient progress and toleration of activity level   Outcome: Progressing     Problem: INFECTION - ADULT  Goal: Absence or prevention of progression during hospitalization  Description: INTERVENTIONS:  - Assess and monitor for signs and symptoms of infection  - Monitor lab/diagnostic results  - Monitor all insertion sites, i e  indwelling lines, tubes, and drains  - Monitor endotracheal if appropriate and nasal secretions for changes in amount and color  - [place] appropriate cooling/warming therapies per order  - Administer medications as ordered  - Instruct and encourage patient and family to use good hand hygiene technique  - Identify and instruct in appropriate isolation precautions for identified infection/condition  Outcome: Progressing     Problem: GENITOURINARY - ADULT  Goal: Maintains or returns to baseline urinary function  Description: INTERVENTIONS:  - Assess urinary function  - Encourage oral fluids to ensure adequate hydration if ordered  - Administer IV fluids as ordered to ensure adequate hydration  - Administer ordered medications as needed  - Offer frequent toileting  - Follow urinary retention protocol if ordered  Outcome: Progressing     Problem: METABOLIC, FLUID AND ELECTROLYTES - ADULT  Goal: Electrolytes maintained within normal limits  Description: INTERVENTIONS:  - Monitor labs and assess patient for signs and symptoms of electrolyte imbalances  - Administer electrolyte replacement as ordered  - Monitor response to electrolyte replacements, including repeat lab results as appropriate  - Instruct patient on fluid and nutrition as appropriate  Outcome: Progressing

## 2022-12-31 NOTE — ED ATTENDING ATTESTATION
12/31/2022  Cristina FISHER DO, saw and evaluated the patient  I have discussed the patient with the resident/non-physician practitioner and agree with the resident's/non-physician practitioner's findings, Plan of Care, and MDM as documented in the resident's/non-physician practitioner's note, except where noted  All available labs and Radiology studies were reviewed  I was present for key portions of any procedure(s) performed by the resident/non-physician practitioner and I was immediately available to provide assistance  At this point I agree with the current assessment done in the Emergency Department  I have conducted an independent evaluation of this patient a history and physical is as follows:    Patient is a 14-year-old male with a history of BPH, urinary retention who presents with fever and pelvic pain  Patient states that he had a Hobbs catheter placed for urinary retention on 12/19   3 days later, he was diagnosed with cystitis and placed on antibiotics  Had discomfort from the Hobbs catheter since it was placed, but the pain worsened significantly today  He describes significant pain in bilateral hips and back  He admits to fevers  He presents to the emergency department today for evaluation and demands that his Hobbs catheter be removed  He has not taken anything for pain today  On exam, patient is in acute distress secondary to pain  Abdomen is soft, nontender, nondistended  No rebound or guarding  Hobbs catheter in place and draining dark yellow urine  No CVA tenderness  Heart is regular rate and rhythm  Breath sounds normal     Workup reveals evidence of cystitis and sepsis  Will initiate IV antibiotics and continue to monitor for urinary retention  Will hospitalize for further workup and treatment  Portions of the above record have been created with voice recognition software    Occasional wrong word or "sound alike" substitutions may have occurred due to the inherent limitations of voice recognition software  Read the chart carefully and recognize, using context, where substitutions may have occurred        ED Course         Critical Care Time  Procedures

## 2022-12-31 NOTE — ASSESSMENT & PLAN NOTE
Presents with urinary retention secondary to BPH, worsening pain, fever  Recent rodriguez placement 12/19   ED removed rodriguez tonight pending voiding trial  - VS: febrile, normotensive  - leukocytosis w/ neutrophil shift, lactate wnl, procal pend  - UA, Ucx, Bloodcx pend, prior UCx had no growth  - CTabd: thick walled urinary bladder consistent with cystitis, air within lumen secondary to catheterization vs gas forming infection, large bladder diverticulum  - ABX: failed outpatient cephalosporin, continue with zosyn and narrow as appropriate as urine culture results

## 2022-12-31 NOTE — H&P
[facility]  H&P- Jose J Murcia 1950, 81 y o  male MRN: [id number]  Unit/Bed#: S -01 Encounter: [id number]  Primary Care Provider: Federico Fowler MD   Date and time admitted to hospital: 12/31/2022 12:17 AM    * Sepsis due to gram-negative UTI [facility])  Assessment & Plan  Presents with urinary retention secondary to BPH, worsening pain, fever  Recent rodriguez placement 12/19  ED removed rodriguez tonight pending voiding trial  - VS: febrile, normotensive  - leukocytosis w/ neutrophil shift, lactate wnl, procal pend  - UA, Ucx, Bloodcx pend, prior UCx had no growth  - CTabd: thick walled urinary bladder consistent with cystitis, air within lumen secondary to catheterization vs gas forming infection, large bladder diverticulum  - ABX: failed outpatient cephalosporin, continue with zosyn and narrow as appropriate          HLD (hyperlipidemia)  Assessment & Plan  Continue PTA statin     Acute urinary retention  Assessment & Plan  Ongoing urinary retention secondary to BPH  Rodriguez removed by ED today for voiding trial  - void trial, if failure then urinary retention protocol/bladder scan   - urology consulted     Coronary artery disease involving native coronary artery of native heart without angina pectoris  Assessment & Plan  Continue PTA ASA    HTN (hypertension)  Assessment & Plan  Continue PTA carvedilol     Stage 3a chronic kidney disease [facility])  Assessment & Plan  Lab Results   Component Value Date    EGFR 54 12/31/2022    EGFR 50 12/21/2022    EGFR 49 12/19/2022    CREATININE 1 32 (H) 12/31/2022    CREATININE 1 41 (H) 12/21/2022    CREATININE 1 43 (H) 12/19/2022     - monitor renal function, renal dose medications, maintain normotension, avoid nephrotoxic agents    VTE Pharmacologic Prophylaxis: VTE Score: 5 High Risk (Score >/= 5) - Pharmacological DVT Prophylaxis Ordered: enoxaparin (Lovenox)  Sequential Compression Devices Ordered    Code Status: Level 1 - Full Code   Discussion with family: Patient declined call to   Anticipated Length of Stay: Patient will be admitted on an inpatient basis with an anticipated length of stay of greater than 2 midnights secondary to sepsis  Total Time for Visit, including Counseling / Coordination of Care: 60 minutes Greater than 50% of this total time spent on direct patient counseling and coordination of care  Chief Complaint: urinary retention    History of Present Illness:  Hanane Regalado is a 81 y o  male with a PMH of HTN, BPH, urinary retention who presents with urinary retention  Patient recently stopped taking his BPH medications and rodriguez was placed on 12/19, pending outpatient urology follow-up for removal and voiding trial  He was treated for a suspected UTI on 12/21 on cefpoxime which completed yesterday  Patient went to [facility] for fever, SOB, low back pain and sent home on oral keflex  Arrives again to ED for fever, severe abdominal pain/back pain and demanding rodriguez be removed  Rodriguez was removed in ED with attempt to void trial      Review of Systems:  Review of Systems   Constitutional: Negative for chills and fever  HENT: Negative for ear pain and sore throat  Eyes: Negative for pain and visual disturbance  Respiratory: Negative for cough and shortness of breath  Cardiovascular: Negative for chest pain and palpitations  Gastrointestinal: Negative for abdominal pain and vomiting  Genitourinary: Negative for dysuria and hematuria  Musculoskeletal: Negative for arthralgias and back pain  Skin: Negative for color change and rash  Neurological: Negative for seizures and syncope  All other systems reviewed and are negative        Past Medical and Surgical History:   Past Medical History:   Diagnosis Date   • BPH (benign prostatic hyperplasia)    • Hx of CABG    • Hypercholesterolemia    • Hypertension        Past Surgical History:   Procedure Laterality Date   • CORONARY ARTERY BYPASS GRAFT Meds/Allergies:  Prior to Admission medications    Medication Sig Start Date End Date Taking? Authorizing Provider   aspirin (ECOTRIN LOW STRENGTH) 81 mg EC tablet Take 81 mg by mouth daily   Yes Historical Provider, MD   carvedilol (COREG) 6 25 mg tablet 3 125 mg 8/29/22  Yes Historical Provider, MD   cephalexin (KEFLEX) 500 mg capsule Take 500 mg by mouth Three times a day 12/30/22 1/6/23 Yes Historical Provider, MD   niacin (SLO-NIACIN) 500 mg tablet 1,000 mg 8/29/22  Yes Historical Provider, MD   pravastatin (PRAVACHOL) 40 mg tablet 60 mg 8/29/22  Yes Historical Provider, MD     I have reviewed home medications with patient personally  Allergies: Allergies   Allergen Reactions   • Dulcolax [Bisacodyl] Hives   • Latex    • Levofloxacin        Social History:  Marital Status: /Civil Union   Occupation:   Patient Pre-hospital Living Situation:   Patient Pre-hospital Level of Mobility:   Patient Pre-hospital Diet Restrictions:   Substance Use History:   Social History     Substance and Sexual Activity   Alcohol Use No     Social History     Tobacco Use   Smoking Status Former   • Types: Cigarettes   Smokeless Tobacco Never     Social History     Substance and Sexual Activity   Drug Use No       Family History:  History reviewed  No pertinent family history  Physical Exam:     Vitals:   Blood Pressure: 150/93 (12/31/22 0021)  Pulse: 63 (12/31/22 0021)  Temperature: 99 1 °F (37 3 °C) (12/31/22 0404)  Temp Source: Oral (12/31/22 0404)  Respirations: 20 (12/31/22 0021)  SpO2: 97 % (12/31/22 0021)    Vitals and nursing note reviewed  Constitutional:       General: She is not in acute distress  Appearance: She is well-developed  HENT:      Head: Normocephalic and atraumatic  Eyes:      Conjunctiva/sclera: Conjunctivae normal    Cardiovascular:      Rate and Rhythm: Normal rate and regular rhythm  Heart sounds: No murmur heard    Pulmonary:      Effort: Pulmonary effort is normal  No respiratory distress  Breath sounds: Normal breath sounds  Abdominal:      Palpations: Abdomen is soft  Tenderness: There is no abdominal tenderness  Musculoskeletal:         General: No swelling  Cervical back: Neck supple  Skin:     General: Skin is warm and dry  Capillary Refill: Capillary refill takes less than 2 seconds  Neurological:      Mental Status: She is alert  Psychiatric:         Mood and Affect: Mood normal      Additional Data:     Lab Results:  Results from last 7 days   Lab Units 12/31/22  0105   WBC Thousand/uL 16 90*   HEMOGLOBIN g/dL 14 7   HEMATOCRIT % 42 3   PLATELETS Thousands/uL 217   NEUTROS PCT % 87*   LYMPHS PCT % 5*   MONOS PCT % 7   EOS PCT % 0     Results from last 7 days   Lab Units 12/31/22  0105   SODIUM mmol/L 139   POTASSIUM mmol/L 4 0   CHLORIDE mmol/L 104   CO2 mmol/L 22   BUN mg/dL 12   CREATININE mg/dL 1 32*   ANION GAP mmol/L 13   CALCIUM mg/dL 9 1   ALBUMIN g/dL 3 8   TOTAL BILIRUBIN mg/dL 0 72   ALK PHOS U/L 76   ALT U/L 14   AST U/L 19   GLUCOSE RANDOM mg/dL 122     Results from last 7 days   Lab Units 12/31/22  0105   INR  1 50*             Results from last 7 days   Lab Units 12/31/22  0105   LACTIC ACID mmol/L 1 4       Lines/Drains:  Invasive Devices     Peripheral Intravenous Line  Duration           Peripheral IV 12/31/22 Left Antecubital <1 day    Peripheral IV 12/31/22 Right;Dorsal (posterior) Hand <1 day                    Imaging: Reviewed radiology reports from this admission including: abdominal/pelvic CT  CT chest abdomen pelvis w contrast   Final Result by Aleta Ortiz MD (12/31 0257)      1  Thick-walled urinary bladder consistent with cystitis  Air within the lumen may be achievable to recent catheterization or gas forming infection     2   Large bladder diverticulum            Workstation performed: [id number]         XR chest 1 view portable    (Results Pending)       EKG and Other Studies Reviewed on Admission:   · EKG: No EKG obtained  ** Please Note: This note has been constructed using a voice recognition system   **

## 2022-12-31 NOTE — RESPIRATORY THERAPY NOTE
RT Protocol Note  Khoa Miller 81 y o  male MRN: [id number]  Unit/Bed#: S -01 Encounter: [id number]    Assessment    Principal Problem:    Sepsis due to gram-negative UTI [facility])  Active Problems:    Stage 3a chronic kidney disease (HCC)    HTN (hypertension)    Coronary artery disease involving native coronary artery of native heart without angina pectoris    Acute urinary retention    HLD (hyperlipidemia)      Home Pulmonary Medications:    Home Devices/Therapy:  (none)    Past Medical History:   Diagnosis Date    BPH (benign prostatic hyperplasia)     Hx of CABG     Hypercholesterolemia     Hypertension      Social History     Socioeconomic History    Marital status: /Civil Union     Spouse name: None    Number of children: None    Years of education: None    Highest education level: None   Occupational History    None   Tobacco Use    Smoking status: Former     Types: Cigarettes    Smokeless tobacco: Never   Substance and Sexual Activity    Alcohol use: No    Drug use: No    Sexual activity: None   Other Topics Concern    None   Social History Narrative    None     Social Determinants of Health     Financial Resource Strain: Not on file   Food Insecurity: Not on file   Transportation Needs: Not on file   Physical Activity: Not on file   Stress: Not on file   Social Connections: Not on file   Intimate Partner Violence: Not on file   Housing Stability: Not on file       Subjective         Objective    Physical Exam:        Vitals:  Blood pressure 97/57, pulse 61, temperature 99 °F (37 2 °C), temperature source Oral, resp  rate 14, SpO2 93 %  Imaging and other studies: I have personally reviewed pertinent reports              Plan    Respiratory Plan: (P) No distress/Pulmonary history, Discontinue Protocol

## 2022-12-31 NOTE — ASSESSMENT & PLAN NOTE
Ongoing urinary retention secondary to BPH   Hobbs removed by ED today for voiding trial   urology consulted-recommendations appreciated  On urinary retention protocol with serial PVRs-required straight cath x1   Restart patient's Flomax and Proscar

## 2022-12-31 NOTE — ASSESSMENT & PLAN NOTE
Lab Results   Component Value Date    EGFR 54 12/31/2022    EGFR 50 12/21/2022    EGFR 49 12/19/2022    CREATININE 1 32 (H) 12/31/2022    CREATININE 1 41 (H) 12/21/2022    CREATININE 1 43 (H) 12/19/2022     - monitor renal function, renal dose medications, maintain normotension, avoid nephrotoxic agents

## 2022-12-31 NOTE — PLAN OF CARE
Problem: Potential for Falls  Goal: Patient will remain free of falls  Description: INTERVENTIONS:  - Educate patient/family on patient safety including physical limitations  - Instruct patient to call for assistance with activity   - Consult OT/PT to assist with strengthening/mobility   - Keep Call bell within reach  - Keep bed low and locked with side rails adjusted as appropriate  - Keep care items and personal belongings within reach  - Initiate and maintain comfort rounds  - Make Fall Risk Sign visible to staff  - Offer Toileting every 2 Hours, in advance of need  - Initiate/Maintain bed alarm  - Obtain necessary fall risk management equipment: alarms  - Apply yellow socks and bracelet for high fall risk patients  - Consider moving patient to room near nurses station  Outcome: Progressing     Problem: MOBILITY - ADULT  Goal: Maintain or return to baseline ADL function  Description: INTERVENTIONS:  -  Assess patient's ability to carry out ADLs; assess patient's baseline for ADL function and identify physical deficits which impact ability to perform ADLs (bathing, care of mouth/teeth, toileting, grooming, dressing, etc )  - Assess/evaluate cause of self-care deficits   - Assess range of motion  - Assess patient's mobility; develop plan if impaired  - Assess patient's need for assistive devices and provide as appropriate  - Encourage maximum independence but intervene and supervise when necessary  - Involve family in performance of ADLs  - Assess for home care needs following discharge   - Consider OT consult to assist with ADL evaluation and planning for discharge  - Provide patient education as appropriate  Outcome: Progressing  Goal: Maintains/Returns to pre admission functional level  Description: INTERVENTIONS:  - Perform BMAT or MOVE assessment daily    - Set and communicate daily mobility goal to care team and patient/family/caregiver     - Collaborate with rehabilitation services on mobility goals if consulted  - Perform Range of Motion 4 times a day  - Reposition patient every 4 hours    - Dangle patient 4 times a day  - Stand patient 4 times a day  - Ambulate patient 4 times a day  - Out of bed to chair 3 times a day   - Out of bed for meals 3 times a day  - Out of bed for toileting  - Record patient progress and toleration of activity level   Outcome: Progressing     Problem: INFECTION - ADULT  Goal: Absence or prevention of progression during hospitalization  Description: INTERVENTIONS:  - Assess and monitor for signs and symptoms of infection  - Monitor lab/diagnostic results  - Monitor all insertion sites, i e  indwelling lines, tubes, and drains  - Monitor endotracheal if appropriate and nasal secretions for changes in amount and color  - [place] appropriate cooling/warming therapies per order  - Administer medications as ordered  - Instruct and encourage patient and family to use good hand hygiene technique  - Identify and instruct in appropriate isolation precautions for identified infection/condition  Outcome: Progressing     Problem: GENITOURINARY - ADULT  Goal: Maintains or returns to baseline urinary function  Description: INTERVENTIONS:  - Assess urinary function  - Encourage oral fluids to ensure adequate hydration if ordered  - Administer IV fluids as ordered to ensure adequate hydration  - Administer ordered medications as needed  - Offer frequent toileting  - Follow urinary retention protocol if ordered  Outcome: Progressing     Problem: METABOLIC, FLUID AND ELECTROLYTES - ADULT  Goal: Electrolytes maintained within normal limits  Description: INTERVENTIONS:  - Monitor labs and assess patient for signs and symptoms of electrolyte imbalances  - Administer electrolyte replacement as ordered  - Monitor response to electrolyte replacements, including repeat lab results as appropriate  - Instruct patient on fluid and nutrition as appropriate  Outcome: Progressing

## 2023-01-01 LAB
ANION GAP SERPL CALCULATED.3IONS-SCNC: 7 MMOL/L (ref 4–13)
BASOPHILS # BLD AUTO: 0.04 THOUSANDS/ÂΜL (ref 0–0.1)
BASOPHILS NFR BLD AUTO: 0 % (ref 0–1)
BUN SERPL-MCNC: 13 MG/DL (ref 5–25)
CALCIUM SERPL-MCNC: 7.9 MG/DL (ref 8.4–10.2)
CHLORIDE SERPL-SCNC: 108 MMOL/L (ref 96–108)
CO2 SERPL-SCNC: 24 MMOL/L (ref 21–32)
CREAT SERPL-MCNC: 1.4 MG/DL (ref 0.6–1.3)
EOSINOPHIL # BLD AUTO: 0.14 THOUSAND/ÂΜL (ref 0–0.61)
EOSINOPHIL NFR BLD AUTO: 1 % (ref 0–6)
ERYTHROCYTE [DISTWIDTH] IN BLOOD BY AUTOMATED COUNT: 13.1 % (ref 11.6–15.1)
GFR SERPL CREATININE-BSD FRML MDRD: 50 ML/MIN/1.73SQ M
GLUCOSE SERPL-MCNC: 99 MG/DL (ref 65–140)
HCT VFR BLD AUTO: 37.8 % (ref 36.5–49.3)
HGB BLD-MCNC: 12.7 G/DL (ref 12–17)
IMM GRANULOCYTES # BLD AUTO: 0.25 THOUSAND/UL (ref 0–0.2)
IMM GRANULOCYTES NFR BLD AUTO: 2 % (ref 0–2)
LYMPHOCYTES # BLD AUTO: 1.27 THOUSANDS/ÂΜL (ref 0.6–4.47)
LYMPHOCYTES NFR BLD AUTO: 9 % (ref 14–44)
MCH RBC QN AUTO: 29.5 PG (ref 26.8–34.3)
MCHC RBC AUTO-ENTMCNC: 33.6 G/DL (ref 31.4–37.4)
MCV RBC AUTO: 88 FL (ref 82–98)
MONOCYTES # BLD AUTO: 0.89 THOUSAND/ÂΜL (ref 0.17–1.22)
MONOCYTES NFR BLD AUTO: 6 % (ref 4–12)
NEUTROPHILS # BLD AUTO: 11.69 THOUSANDS/ÂΜL (ref 1.85–7.62)
NEUTS SEG NFR BLD AUTO: 82 % (ref 43–75)
NRBC BLD AUTO-RTO: 0 /100 WBCS
PLATELET # BLD AUTO: 177 THOUSANDS/UL (ref 149–390)
PMV BLD AUTO: 9.5 FL (ref 8.9–12.7)
POTASSIUM SERPL-SCNC: 3.5 MMOL/L (ref 3.5–5.3)
PROCALCITONIN SERPL-MCNC: 6.1 NG/ML
RBC # BLD AUTO: 4.31 MILLION/UL (ref 3.88–5.62)
SODIUM SERPL-SCNC: 139 MMOL/L (ref 135–147)
WBC # BLD AUTO: 14.28 THOUSAND/UL (ref 4.31–10.16)

## 2023-01-01 RX ORDER — ALFUZOSIN HYDROCHLORIDE 10 MG/1
10 TABLET, EXTENDED RELEASE ORAL DAILY
COMMUNITY

## 2023-01-01 RX ADMIN — PIPERACILLIN AND TAZOBACTAM 3.38 G: 36; 4.5 INJECTION, POWDER, FOR SOLUTION INTRAVENOUS at 15:38

## 2023-01-01 RX ADMIN — PIPERACILLIN AND TAZOBACTAM 3.38 G: 36; 4.5 INJECTION, POWDER, FOR SOLUTION INTRAVENOUS at 20:48

## 2023-01-01 RX ADMIN — PIPERACILLIN AND TAZOBACTAM 3.38 G: 36; 4.5 INJECTION, POWDER, FOR SOLUTION INTRAVENOUS at 02:30

## 2023-01-01 RX ADMIN — PIPERACILLIN AND TAZOBACTAM 3.38 G: 36; 4.5 INJECTION, POWDER, FOR SOLUTION INTRAVENOUS at 09:44

## 2023-01-01 RX ADMIN — SODIUM CHLORIDE 100 ML/HR: 0.9 INJECTION, SOLUTION INTRAVENOUS at 07:49

## 2023-01-01 RX ADMIN — PRAVASTATIN SODIUM 60 MG: 40 TABLET ORAL at 15:40

## 2023-01-01 RX ADMIN — ASPIRIN 81 MG: 81 TABLET, COATED ORAL at 07:54

## 2023-01-01 RX ADMIN — FINASTERIDE 5 MG: 5 TABLET, FILM COATED ORAL at 07:54

## 2023-01-01 RX ADMIN — TAMSULOSIN HYDROCHLORIDE 0.4 MG: 0.4 CAPSULE ORAL at 15:40

## 2023-01-01 RX ADMIN — CARVEDILOL 3.12 MG: 3.12 TABLET, FILM COATED ORAL at 07:54

## 2023-01-01 NOTE — NURSING NOTE
Bladder scanned patient for 490 mL's at 2100  Order from urology says to place a rodriguez catheter if patient is retaining over 450 mL of urine in bladder  Patient is refusing to have a rodriguez catheter  I educated him on the reason for a rodriguez catheter  Spoke with urology and got permission to straight cath patient  Got 600 mL's out of bladder  Continue to bladder scan and straight cath per urology  Will continue to educate about rodriguez catheter purposes

## 2023-01-01 NOTE — PROGRESS NOTES
[facility]  Progress Note - Joelle Payer 1950, 81 y o  male MRN: [id number]  Unit/Bed#: S -01 Encounter: [id number]  Primary Care Provider: Alysia Jackson MD   Date and time admitted to hospital: 12/31/2022 12:17 AM    * Sepsis due to gram-negative UTI [facility])  Assessment & Plan  Presents with urinary retention secondary to BPH, worsening pain, fever  Recent rodriguez placement 12/19  ED removed rodriguez tonight pending voiding trial  - VS: febrile, normotensive  - leukocytosis w/ neutrophil shift, lactate wnl, procal pend  - UA, Ucx, Bloodcx pend, prior UCx had no growth  - CTabd: thick walled urinary bladder consistent with cystitis, air within lumen secondary to catheterization vs gas forming infection, large bladder diverticulum  - ABX: failed outpatient cephalosporin, continue with zosyn and narrow as appropriate as urine culture results         Acute urinary retention  Assessment & Plan  Ongoing urinary retention secondary to BPH   Rodriguez removed by ED today for voiding trial   urology consulted-recommendations appreciated  On urinary retention protocol with serial PVRs-required straight cath x1   Restart patient's Flomax and Proscar     HLD (hyperlipidemia)  Assessment & Plan  Continue PTA statin     Coronary artery disease involving native coronary artery of native heart without angina pectoris  Assessment & Plan  Continue PTA ASA    HTN (hypertension)  Assessment & Plan  Continue PTA carvedilol     Stage 3a chronic kidney disease [facility])  Assessment & Plan  Lab Results   Component Value Date    EGFR 54 12/31/2022    EGFR 50 12/21/2022    EGFR 49 12/19/2022    CREATININE 1 32 (H) 12/31/2022    CREATININE 1 41 (H) 12/21/2022    CREATININE 1 43 (H) 12/19/2022     - monitor renal function, renal dose medications, maintain normotension, avoid nephrotoxic agents        VTE Pharmacologic Prophylaxis: VTE Score: 5 High Risk (Score >/= 5) - Pharmacological DVT Prophylaxis Ordered: enoxaparin (Lovenox)  Sequential Compression Devices Ordered  Refused Lovenox this morning  Patient Centered Rounds: I performed bedside rounds with nursing staff today  Discussions with Specialists or Other Care Team Provider: Urology    Education and Discussions with Family / Patient: Updated  (wife) at bedside  Current Length of Stay: 1 day(s)  Current Patient Status: Inpatient   Discharge Plan: Anticipate discharge in 24-48 hrs to home  Code Status: Level 1 - Full Code    Subjective:   Found sitting in the side of the bed, not in acute distress at the time of my evaluation  He endorses some abdominal bloating, however notes that he does not want to stay here longer than he has to  He denies dizziness, lightheadedness, chest pain, shortness of breath, palpitations, abdominal tenderness, changes in bowel habits  PVR 320cc this morning, overall patient has required straight cath x1 per RN  Objective:     Vitals:   Temp (24hrs), Av 1 °F (37 3 °C), Min:98 3 °F (36 8 °C), Max:100 °F (37 8 °C)    Temp:  [98 3 °F (36 8 °C)-100 °F (37 8 °C)] 98 3 °F (36 8 °C)  HR:  [61-72] 69  Resp:  [14-16] 16  BP: ()/(54-68) 127/68  SpO2:  [91 %-93 %] 91 %  There is no height or weight on file to calculate BMI  Input and Output Summary (last 24 hours): Intake/Output Summary (Last 24 hours) at 2023 1156  Last data filed at 2023 1138  Gross per 24 hour   Intake 360 ml   Output 2600 ml   Net -2240 ml       Physical Exam:   Physical Exam  Vitals and nursing note reviewed  Constitutional:       General: He is not in acute distress  Appearance: Normal appearance  He is not ill-appearing, toxic-appearing or diaphoretic  HENT:      Head: Normocephalic and atraumatic  Nose: Nose normal       Mouth/Throat:      Mouth: Mucous membranes are moist       Pharynx: Oropharynx is clear  Eyes:      General: No scleral icterus  Right eye: No discharge  Left eye: No discharge  Extraocular Movements: Extraocular movements intact  Conjunctiva/sclera: Conjunctivae normal    Cardiovascular:      Rate and Rhythm: Normal rate and regular rhythm  Pulses: Normal pulses  Heart sounds: Normal heart sounds  No murmur heard  Pulmonary:      Effort: Pulmonary effort is normal  No respiratory distress  Breath sounds: Normal breath sounds  No wheezing, rhonchi or rales  Abdominal:      General: Abdomen is flat  Bowel sounds are normal  There is no distension  Palpations: Abdomen is soft  Tenderness: There is no abdominal tenderness  There is no guarding or rebound  Musculoskeletal:      Cervical back: Normal range of motion and neck supple  Right lower leg: Edema present  Left lower leg: Edema present  Comments: 1+ pitting edema in the lower extremities bilaterally  Skin:     General: Skin is warm and dry  Coloration: Skin is not jaundiced or pale  Neurological:      Mental Status: He is alert and oriented to person, place, and time  Mental status is at baseline  Psychiatric:         Mood and Affect: Mood normal          Behavior: Behavior normal          Thought Content:  Thought content normal          Judgment: Judgment normal           Additional Data:     Labs:  Results from last 7 days   Lab Units 01/01/23  0441   WBC Thousand/uL 14 28*   HEMOGLOBIN g/dL 12 7   HEMATOCRIT % 37 8   PLATELETS Thousands/uL 177   NEUTROS PCT % 82*   LYMPHS PCT % 9*   MONOS PCT % 6   EOS PCT % 1     Results from last 7 days   Lab Units 01/01/23  0441 12/31/22  0523   SODIUM mmol/L 139 139   POTASSIUM mmol/L 3 5 3 7   CHLORIDE mmol/L 108 105   CO2 mmol/L 24 25   BUN mg/dL 13 13   CREATININE mg/dL 1 40* 1 35*   ANION GAP mmol/L 7 9   CALCIUM mg/dL 7 9* 8 1*   ALBUMIN g/dL  --  3 0*   TOTAL BILIRUBIN mg/dL  --  0 80   ALK PHOS U/L  --  60   ALT U/L  --  10   AST U/L  --  15   GLUCOSE RANDOM mg/dL 99 111     Results from last 7 days   Lab Units 12/31/22  0105 INR  1 50*             Results from last 7 days   Lab Units 01/01/23  0441 12/31/22  0105   LACTIC ACID mmol/L  --  1 4   PROCALCITONIN ng/ml 6 10* 4 39*       Lines/Drains:  Invasive Devices     Peripheral Intravenous Line  Duration           Peripheral IV 12/31/22 Right;Dorsal (posterior) Hand 1 day                      Imaging: Reviewed radiology reports from this admission including: chest xray and abdominal/pelvic CT    Recent Cultures (last 7 days):   Results from last 7 days   Lab Units 12/31/22  1046 12/31/22  0120 12/31/22  0105   BLOOD CULTURE   --  No Growth at 24 hrs  No Growth at 24 hrs  URINE CULTURE  Culture too young- will reincubate  --   --        Last 24 Hours Medication List:   Current Facility-Administered Medications   Medication Dose Route Frequency Provider Last Rate   • acetaminophen  650 mg Oral Q6H PRN Jos Dasilva MD     • aspirin  81 mg Oral Daily Steve Fink PA-C     • carvedilol  3 125 mg Oral BID With Meals Steve Fink PA-C     • enoxaparin  40 mg Subcutaneous Daily Steve Fink PA-C     • finasteride  5 mg Oral Daily Steve Fink PA-C     • piperacillin-tazobactam  3 375 g Intravenous Q6H Steve Fink PA-C 3 375 g (01/01/23 0944)   • pravastatin  60 mg Oral Daily With Chemo Mohamud PA-C     • sodium chloride  100 mL/hr Intravenous Continuous Estefanía Lao  mL/hr (01/01/23 0749)   • tamsulosin  0 4 mg Oral Daily With Dinner Steve Fink PA-C          Today, Patient Was Seen By: Desean Sullivan MD    **Please Note: This note may have been constructed using a voice recognition system  **

## 2023-01-01 NOTE — PLAN OF CARE
Problem: Potential for Falls  Goal: Patient will remain free of falls  Description: INTERVENTIONS:  - Educate patient/family on patient safety including physical limitations  - Instruct patient to call for assistance with activity   - Consult OT/PT to assist with strengthening/mobility   - Keep Call bell within reach  - Keep bed low and locked with side rails adjusted as appropriate  - Keep care items and personal belongings within reach  - Initiate and maintain comfort rounds  - Apply yellow socks and bracelet for high fall risk patients  - Consider moving patient to room near nurses station  Outcome: Progressing     Problem: MOBILITY - ADULT  Goal: Maintain or return to baseline ADL function  Description: INTERVENTIONS:  -  Assess patient's ability to carry out ADLs; assess patient's baseline for ADL function and identify physical deficits which impact ability to perform ADLs (bathing, care of mouth/teeth, toileting, grooming, dressing, etc )  - Assess/evaluate cause of self-care deficits   - Assess range of motion  - Assess patient's mobility; develop plan if impaired  - Assess patient's need for assistive devices and provide as appropriate  - Encourage maximum independence but intervene and supervise when necessary  - Involve family in performance of ADLs  - Assess for home care needs following discharge   - Consider OT consult to assist with ADL evaluation and planning for discharge  - Provide patient education as appropriate  Outcome: Progressing  Goal: Maintains/Returns to pre admission functional level  Description: INTERVENTIONS:  - Perform BMAT or MOVE assessment daily    - Set and communicate daily mobility goal to care team and patient/family/caregiver     - Out of bed for toileting  - Record patient progress and toleration of activity level   Outcome: Progressing     Problem: INFECTION - ADULT  Goal: Absence or prevention of progression during hospitalization  Description: INTERVENTIONS:  - Assess and monitor for signs and symptoms of infection  - Monitor lab/diagnostic results  - Monitor all insertion sites, i e  indwelling lines, tubes, and drains  - Monitor endotracheal if appropriate and nasal secretions for changes in amount and color  - [place] appropriate cooling/warming therapies per order  - Administer medications as ordered  - Instruct and encourage patient and family to use good hand hygiene technique  - Identify and instruct in appropriate isolation precautions for identified infection/condition  Outcome: Progressing     Problem: GENITOURINARY - ADULT  Goal: Maintains or returns to baseline urinary function  Description: INTERVENTIONS:  - Assess urinary function  - Encourage oral fluids to ensure adequate hydration if ordered  - Administer IV fluids as ordered to ensure adequate hydration  - Administer ordered medications as needed  - Offer frequent toileting  - Follow urinary retention protocol if ordered  Outcome: Progressing     Problem: METABOLIC, FLUID AND ELECTROLYTES - ADULT  Goal: Electrolytes maintained within normal limits  Description: INTERVENTIONS:  - Monitor labs and assess patient for signs and symptoms of electrolyte imbalances  - Administer electrolyte replacement as ordered  - Monitor response to electrolyte replacements, including repeat lab results as appropriate  - Instruct patient on fluid and nutrition as appropriate  Outcome: Progressing

## 2023-01-01 NOTE — UTILIZATION REVIEW
Initial Clinical Review    Admission: Date/Time/Statement:   Admission Orders (From admission, onward)     Ordered        12/31/22 0329  INPATIENT ADMISSION  Once                      Orders Placed This Encounter   Procedures   • INPATIENT ADMISSION     Standing Status:   Standing     Number of Occurrences:   1     Order Specific Question:   Level of Care     Answer:   Med Surg [16]     Order Specific Question:   Estimated length of stay     Answer:   More than 2 Midnights     Order Specific Question:   Certification     Answer:   I certify that inpatient services are medically necessary for this patient for a duration of greater than two midnights  See H&P and MD Progress Notes for additional information about the patient's course of treatment  ED Arrival Information     Expected   -    Arrival   12/31/2022 00:11    Acuity   Urgent            Means of arrival   Wheelchair    Escorted by   Spouse    Service   Hospitalist    Admission type   Emergency            Arrival complaint   Urinary Catheter problem           Chief Complaint   Patient presents with   • Flank Pain     Left sided flank pain  Patient received a rodriguez on a recent admission  Was seen at [facility] today and was told he has a kidney infection          Initial Presentation: 81 y o  male PMH htn, CKD3a,  bph w recent episode of Urinary retention admit req Rodriguez placement antibx completion for suspect UTI waiting on OP Urology for void trial , recent admit to nearby [address] on PO Keflex for fever, SOB, low back pain to ED w Spouse reports w fever, sev ABD paiin/ back pain demanding Rodriguez removal   Reports discomfort w Rodriguez since it was place    EXAM  Acute distress & incolsolable; Labs  Abn UA; leukocytosis, elevated CR, CT c/abd reveal findings for Cystitis w large bladder diverticulum, + stevo LE edema  Inpatient admission due to Sepsis due to GRAM Neg UTI, acute Urinary retention  OP Antibx failure cont w IV Zosyn, narrow following cultures; Rodriguez removed by ED staff for void trial if failure the urinary retention protocol/ bladder scan & consult Urology Cont home meds    Urology   Films review for thickened bladder wall and a bladder diverticulum along with an enlarged prostate  The bladder diverticulum acts as a pop-off valve for the obstructed bladder outlet and makes emptying difficult  He will require a cystoscopy and transrectal ultrasound to aid in operative planning and will likely require a diverticulectomy at some point as well with cystorrhaphy  Rec cont  both finasteride and tamsulosin at this time , rodriguez catheter is recommended  RN Note  Bladder scanned patient for 490 mL's at 2100  Order from urology says to place a rodriguez catheter if patient is retaining over 450 mL of urine in bladder  Patient is refusing, order for straight cath  Date:  1/1/2023   Day 2:   Attending  Reports less abdominal pain and nausea  Still awaiting urine culture  Tolerate antibiotics without issue  Required straight catheterization x1    Inquiring whether he could be taught straight catheterization rather than indwelling Rodriguez, resistant to leave w Rodriguez in place  ED Triage Vitals [12/31/22 0021]   Temperature Pulse Respirations Blood Pressure SpO2   (!) 101 4 °F (38 6 °C) 63 20 150/93 97 %      Temp Source Heart Rate Source Patient Position - Orthostatic VS BP Location FiO2 (%)   Oral Monitor Sitting Right arm --      Pain Score       10 - Worst Possible Pain          Wt Readings from Last 1 Encounters:   12/21/22 85 kg (187 lb 6 3 oz)     Additional Vital Signs:   Date/Time Temp Pulse Resp BP MAP (mmHg) SpO2 O2 Device Patient Position - Orthostatic VS   01/01/23 1549 -- 80 -- -- -- -- -- --   01/01/23 1522 99 °F (37 2 °C) 40 Abnormal  18 110/82 -- 94 % None (Room air) Lying   01/01/23 0700 98 3 °F (36 8 °C) 69 16 127/68 -- 91 % None (Room air) Lying   12/31/22 2148 100 °F (37 8 °C) 72 16 100/54 72 93 % None (Room air) Lying   12/31/22 1700 -- -- -- -- -- -- None (Room air) --   12/31/22 1500 99 °F (37 2 °C) 61 14 97/57 73 93 % None (Room air) Lying   12/31/22 0857 -- -- -- 100/48 Abnormal  -- -- -- Lying   12/31/22 0700 99 1 °F (37 3 °C) 73 16 84/41 Abnormal  58 Abnormal  94 % -- Lying   12/31/22 0450 -- -- -- -- -- -- None (Room air) --   12/31/22 0404 99 1 °F (37 3 °C) -- -- -- -- -- -- --   12/31/22 0021 101 4 °F (38 6 °C) Abnormal  63 20 150/93 -- 97 % None (Room air) Sitting       Pertinent Labs/Diagnostic Test Results:   CT chest abdomen pelvis w contrast   Final Result by Dane Sinclair MD (12/31 0257)      1  Thick-walled urinary bladder consistent with cystitis  Air within the lumen may be achievable to recent catheterization or gas forming infection  2   Large bladder diverticulum      XR chest 1 view portable   Final Result by Jessy Noel MD (12/31 0889)      No acute cardiopulmonary disease           Results from last 7 days   Lab Units 01/01/23 0441 12/31/22 0523 12/31/22  0105   WBC Thousand/uL 14 28* 13 71* 16 90*   HEMOGLOBIN g/dL 12 7 12 5 14 7   HEMATOCRIT % 37 8 37 0 42 3   PLATELETS Thousands/uL 177 182 217   NEUTROS ABS Thousands/µL 11 69*  --  14 63*         Results from last 7 days   Lab Units 01/01/23 0441 12/31/22  0523 12/31/22  0105   SODIUM mmol/L 139 139 139   POTASSIUM mmol/L 3 5 3 7 4 0   CHLORIDE mmol/L 108 105 104   CO2 mmol/L 24 25 22   ANION GAP mmol/L 7 9 13   BUN mg/dL 13 13 12   CREATININE mg/dL 1 40* 1 35* 1 32*   EGFR ml/min/1 73sq m 50 52 54   CALCIUM mg/dL 7 9* 8 1* 9 1     Results from last 7 days   Lab Units 12/31/22  0523 12/31/22  0105   AST U/L 15 19   ALT U/L 10 14   ALK PHOS U/L 60 76   TOTAL PROTEIN g/dL 5 4* 6 9   ALBUMIN g/dL 3 0* 3 8   TOTAL BILIRUBIN mg/dL 0 80 0 72         Results from last 7 days   Lab Units 01/01/23  0441 12/31/22  0523 12/31/22  0105   GLUCOSE RANDOM mg/dL 99 111 122             No results found for: BETA-HYDROXYBUTYRATE                   Results from last 7 days   Lab Units 12/31/22  0316 12/31/22  0105   HS TNI 0HR ng/L  --  27   HS TNI 2HR ng/L 28  --    HSTNI D2 ng/L 1  --          Results from last 7 days   Lab Units 12/31/22  0105   PROTIME seconds 18 3*   INR  1 50*   PTT seconds 29         Results from last 7 days   Lab Units 01/01/23  0441 12/31/22  0105   PROCALCITONIN ng/ml 6 10* 4 39*     Results from last 7 days   Lab Units 12/31/22  0105   LACTIC ACID mmol/L 1 4             Results from last 7 days   Lab Units 12/31/22  0105   BNP pg/mL 357*                             Results from last 7 days   Lab Units 12/31/22  1046   CLARITY UA  Turbid   COLOR UA  Light Yellow   SPEC GRAV UA  1 045*   PH UA  7 5   GLUCOSE UA mg/dl Negative   KETONES UA mg/dl Negative   BLOOD UA  Small*   PROTEIN UA mg/dl 30 (1+)*   NITRITE UA  Negative   BILIRUBIN UA  Negative   UROBILINOGEN UA (BE) mg/dl <2 0   LEUKOCYTES UA  Large*   WBC UA /hpf Innumerable*   RBC UA /hpf 20-30*   BACTERIA UA /hpf None Seen   EPITHELIAL CELLS WET PREP /hpf None Seen   MUCUS THREADS  Occasional*         Results from last 7 days   Lab Units 12/31/22  1046 12/31/22  0120 12/31/22  0105   BLOOD CULTURE   --  No Growth at 24 hrs  No Growth at 24 hrs     URINE CULTURE  Culture too young- will reincubate  --   --          ED Treatment:   Medication Administration from 12/31/2022 0011 to 12/31/2022 0436       Date/Time Order Dose Route Action     12/31/2022 0125 EST multi-electrolyte (ISOLYTE-S PH 7 4) bolus 500 mL 500 mL Intravenous New Bag     12/31/2022 0126 EST acetaminophen (TYLENOL) tablet 975 mg 975 mg Oral Given     12/31/2022 0126 EST HYDROmorphone (DILAUDID) injection 0 5 mg 0 5 mg Intravenous Given     12/31/2022 0126 EST ondansetron (ZOFRAN) injection 4 mg 4 mg Intravenous Given     12/31/2022 0330 EST piperacillin-tazobactam (ZOSYN) 3 375 g in sodium chloride 0 9 % 100 mL IVPB 3 375 g Intravenous New Bag        Past Medical History:   Diagnosis Date   • BPH (benign prostatic hyperplasia)    • Hx of CABG    • Hypercholesterolemia    • Hypertension      Present on Admission:  • HTN (hypertension)  • HLD (hyperlipidemia)  • Coronary artery disease involving native coronary artery of native heart without angina pectoris      Admitting Diagnosis: Bladder diverticulum [N32 3]  Cystitis [N30 90]  Flank pain [R10 9]  Acute urinary retention [R33 8]  Age/Sex: 81 y o  male  Admission Orders:  NC FOR POX =/>  92%  URINARY RETENTION PROTOCOL     Scheduled Medications:  aspirin, 81 mg, Oral, Daily  carvedilol, 3 125 mg, Oral, BID With Meals  enoxaparin, 40 mg, Subcutaneous, Daily  finasteride, 5 mg, Oral, Daily  piperacillin-tazobactam, 3 375 g, Intravenous, Q6H  pravastatin, 60 mg, Oral, Daily With Dinner  tamsulosin, 0 4 mg, Oral, Daily With Dinner      Continuous IV Infusions:  sodium chloride, 100 mL/hr, Intravenous, Continuous      PRN Meds:  acetaminophen, 650 mg, Oral, Q6H PRN        IP CONSULT TO UROLOGY    Network Utilization Review Department  ATTENTION: Please call with any questions or concerns to [phone] and carefully listen to the prompts so that you are directed to the right person  All voicemails are confidential   Jessica Person all requests for admission clinical reviews, approved or denied determinations and any other requests to dedicated fax number below belonging to the campus where the patient is receiving treatment   List of dedicated fax numbers for the Facilities:  [address] DENIALS (Administrative/Medical Necessity) [phone]   [address] (Maternity/NICU/Pediatrics) [phone]   [address] Val[address] [phone]   [facility] [phone]   [place] [phone]   [facility]5 [address] Julio César [address] [place] [address] [address] [facility] 022 [facility] [phone] [address] Juan Dempsey [place] [address] [facility] [phone]

## 2023-01-02 LAB
ANION GAP SERPL CALCULATED.3IONS-SCNC: 6 MMOL/L (ref 4–13)
BACTERIA UR CULT: ABNORMAL
BACTERIA UR CULT: ABNORMAL
BASOPHILS # BLD AUTO: 0.03 THOUSANDS/ÂΜL (ref 0–0.1)
BASOPHILS NFR BLD AUTO: 0 % (ref 0–1)
BUN SERPL-MCNC: 11 MG/DL (ref 5–25)
CALCIUM SERPL-MCNC: 8 MG/DL (ref 8.4–10.2)
CHLORIDE SERPL-SCNC: 112 MMOL/L (ref 96–108)
CO2 SERPL-SCNC: 23 MMOL/L (ref 21–32)
CREAT SERPL-MCNC: 1.21 MG/DL (ref 0.6–1.3)
EOSINOPHIL # BLD AUTO: 0.2 THOUSAND/ÂΜL (ref 0–0.61)
EOSINOPHIL NFR BLD AUTO: 2 % (ref 0–6)
ERYTHROCYTE [DISTWIDTH] IN BLOOD BY AUTOMATED COUNT: 13.2 % (ref 11.6–15.1)
GFR SERPL CREATININE-BSD FRML MDRD: 60 ML/MIN/1.73SQ M
GLUCOSE SERPL-MCNC: 103 MG/DL (ref 65–140)
HCT VFR BLD AUTO: 39.3 % (ref 36.5–49.3)
HGB BLD-MCNC: 13.2 G/DL (ref 12–17)
IMM GRANULOCYTES # BLD AUTO: 0.04 THOUSAND/UL (ref 0–0.2)
IMM GRANULOCYTES NFR BLD AUTO: 0 % (ref 0–2)
LYMPHOCYTES # BLD AUTO: 1.45 THOUSANDS/ÂΜL (ref 0.6–4.47)
LYMPHOCYTES NFR BLD AUTO: 15 % (ref 14–44)
MAGNESIUM SERPL-MCNC: 1.9 MG/DL (ref 1.9–2.7)
MCH RBC QN AUTO: 29.4 PG (ref 26.8–34.3)
MCHC RBC AUTO-ENTMCNC: 33.6 G/DL (ref 31.4–37.4)
MCV RBC AUTO: 88 FL (ref 82–98)
MONOCYTES # BLD AUTO: 0.94 THOUSAND/ÂΜL (ref 0.17–1.22)
MONOCYTES NFR BLD AUTO: 10 % (ref 4–12)
NEUTROPHILS # BLD AUTO: 6.73 THOUSANDS/ÂΜL (ref 1.85–7.62)
NEUTS SEG NFR BLD AUTO: 73 % (ref 43–75)
NRBC BLD AUTO-RTO: 0 /100 WBCS
PLATELET # BLD AUTO: 194 THOUSANDS/UL (ref 149–390)
PMV BLD AUTO: 9.9 FL (ref 8.9–12.7)
POTASSIUM SERPL-SCNC: 3.7 MMOL/L (ref 3.5–5.3)
PROCALCITONIN SERPL-MCNC: 4.27 NG/ML
RBC # BLD AUTO: 4.49 MILLION/UL (ref 3.88–5.62)
SODIUM SERPL-SCNC: 141 MMOL/L (ref 135–147)
WBC # BLD AUTO: 9.39 THOUSAND/UL (ref 4.31–10.16)

## 2023-01-02 RX ORDER — CEFEPIME HYDROCHLORIDE 1 G/50ML
1000 INJECTION, SOLUTION INTRAVENOUS EVERY 6 HOURS
Status: DISCONTINUED | OUTPATIENT
Start: 2023-01-02 | End: 2023-01-02

## 2023-01-02 RX ADMIN — CARVEDILOL 3.12 MG: 3.12 TABLET, FILM COATED ORAL at 08:34

## 2023-01-02 RX ADMIN — PRAVASTATIN SODIUM 60 MG: 40 TABLET ORAL at 17:18

## 2023-01-02 RX ADMIN — PIPERACILLIN AND TAZOBACTAM 3.38 G: 36; 4.5 INJECTION, POWDER, FOR SOLUTION INTRAVENOUS at 08:38

## 2023-01-02 RX ADMIN — TAMSULOSIN HYDROCHLORIDE 0.4 MG: 0.4 CAPSULE ORAL at 17:18

## 2023-01-02 RX ADMIN — PIPERACILLIN AND TAZOBACTAM 3.38 G: 36; 4.5 INJECTION, POWDER, FOR SOLUTION INTRAVENOUS at 02:34

## 2023-01-02 RX ADMIN — CEFEPIME 2000 MG: 2 INJECTION, POWDER, FOR SOLUTION INTRAVENOUS at 14:43

## 2023-01-02 RX ADMIN — ASPIRIN 81 MG: 81 TABLET, COATED ORAL at 08:34

## 2023-01-02 RX ADMIN — SODIUM CHLORIDE 100 ML/HR: 0.9 INJECTION, SOLUTION INTRAVENOUS at 11:06

## 2023-01-02 RX ADMIN — SODIUM CHLORIDE 100 ML/HR: 0.9 INJECTION, SOLUTION INTRAVENOUS at 02:34

## 2023-01-02 RX ADMIN — CARVEDILOL 3.12 MG: 3.12 TABLET, FILM COATED ORAL at 17:18

## 2023-01-02 RX ADMIN — FINASTERIDE 5 MG: 5 TABLET, FILM COATED ORAL at 08:34

## 2023-01-02 NOTE — ASSESSMENT & PLAN NOTE
Urinary retention with known BPH   Hobbs removed by ED on presentation for voiding trial  This admission, CT A/P shows thick-walled urinary bladder consistent with cystitis and large bladder diverticulum    urology on board, recommendations appreciated  Continue urinary retention protocol with serial PVRs  Restart patient's Flomax and Proscar   As above, pt declining Hobbs; instruct pt in intermittent self-cath BID and monitor for approp technique  Will need f/u with Uro o/p as above

## 2023-01-02 NOTE — PROGRESS NOTES
[facility]  Progress Note - Rebekah Bolivar 1950, 81 y o  male MRN: [id number]  Unit/Bed#: S -01 Encounter: [id number]  Primary Care Provider: Joseph Leahy MD   Date and time admitted to hospital: 12/31/2022 12:17 AM    * Sepsis due to gram-negative UTI [facility])  Assessment & Plan  Presents with urinary retention w known BPH, worsening pain, fever  Recent rodriguez placement 12/19  ED removed rodriguez night of admission   - VS: febrile, normotensive  - leukocytosis w/ neutrophil shift, lactate wnl, procal elevated (peak 6 1)  - Ucx growing 30-39k cfu Pseudomonas with good susceptibility to cefepime  -[email or web address] [email or web address]  - CTabd: thick walled urinary bladder consistent with cystitis, air within lumen secondary to catheterization vs gas forming infection, large bladder diverticulum causing urinary stasis/difficulty voiding  - ABX: failed outpatient cephalosporin, received zosyn starting 12/30 to 1/2    Plan:  -Urology on board, appreciate recommendations  -Discontinue zosyn and start cefepime 2g q12h (renal dosing) for better susceptibility profile  Per pharm, 3 days IV beta-lactam is adequate treatment for complicated UTI  -Cannot transition to oral agent, given pt's allergy to levofloxacin ("bad hives")  -Rodriguez recommended per Urology to prevent urinary stasis in bladder diverticulum, however pt declined, would prefer intermittent straight cathing   -Provide pt instruction and supplies for intermittent straight cathing BID, monitor for appropriate technique  -f/u in Uro clinic within 1 week of discharge for further cath supplies and instruction  -Urology to schedule for o/p cystoscopy and transrectal US to guide subsequent operative management, anticipating he will require diverticulectomy and cystorrhaphy  Acute urinary retention  Assessment & Plan  Urinary retention with known BPH   Rodriguez removed by ED on presentation for voiding trial  This admission, CT A/P shows thick-walled urinary bladder consistent with cystitis and large bladder diverticulum    urology on board, recommendations appreciated  Continue urinary retention protocol with serial PVRs  Restart patient's Flomax and Proscar   As above, pt declining Hobbs; instruct pt in intermittent self-cath BID and monitor for approp technique  Will need f/u with Uro o/p as above    Stage 3a chronic kidney disease [facility])  Assessment & Plan  Lab Results   Component Value Date    EGFR 60 2023    EGFR 50 2023    EGFR 52 2022    CREATININE 1 21 2023    CREATININE 1 40 (H) 2023    CREATININE 1 35 (H) 2022     - monitor renal function, renal dose medications, maintain normotension, avoid nephrotoxic agents      VTE Pharmacologic Prophylaxis: VTE Score: 5 High Risk (Score >/= 5) - Pharmacological DVT Prophylaxis Ordered: enoxaparin (Lovenox)  Sequential Compression Devices Ordered  Patient Centered Rounds: I performed bedside rounds with nursing staff today  Discussions with Specialists or Other Care Team Provider: Urology, Pharmacy, ID    Education and Discussions with Family / Patient: Will call pt contact if not seen at bedside  Current Length of Stay: 2 day(s)  Current Patient Status: Inpatient   Discharge Plan: Anticipate discharge in 48-72 hrs to home  Code Status: Level 1 - Full Code    Subjective: This morning, Mr Silvano Mcgovern was seen asleep in bed, easy to wake, alert, engaged, in no acute distress  He reports feeling improved from admission, voiding volitionally with incomplete emptying of the bladder  Denies fevers, chills, CP, SOB, abd pain  No new or worsening sxs  Objective:     Vitals:   Temp (24hrs), Av 6 °F (37 °C), Min:98 °F (36 7 °C), Max:99 °F (37 2 °C)    Temp:  [98 °F (36 7 °C)-99 °F (37 2 °C)] 98 °F (36 7 °C)  HR:  [40-80] 65  Resp:  [18] 18  BP: (110-149)/(73-97) 144/73  SpO2:  [94 %-96 %] 96 %  There is no height or weight on file to calculate BMI       Input and Output Summary (last 24 hours): Intake/Output Summary (Last 24 hours) at 1/2/2023 1514  Last data filed at 1/2/2023 1001  Gross per 24 hour   Intake --   Output 1519 ml   Net -1519 ml       Physical Exam:   Physical Exam  Vitals and nursing note reviewed  Constitutional:       General: He is not in acute distress  Appearance: Normal appearance  He is obese  He is not ill-appearing, toxic-appearing or diaphoretic  HENT:      Head: Normocephalic and atraumatic  Eyes:      General: No scleral icterus  Right eye: No discharge  Left eye: No discharge  Extraocular Movements: Extraocular movements intact  Conjunctiva/sclera: Conjunctivae normal    Cardiovascular:      Rate and Rhythm: Normal rate and regular rhythm  Pulses: Normal pulses  Heart sounds: Normal heart sounds  No murmur heard  No friction rub  No gallop  Pulmonary:      Effort: Pulmonary effort is normal  No respiratory distress  Breath sounds: Normal breath sounds  No stridor  No wheezing, rhonchi or rales  Chest:      Chest wall: No tenderness  Abdominal:      General: Abdomen is flat  Bowel sounds are normal  There is no distension  Palpations: Abdomen is soft  Tenderness: There is no abdominal tenderness  There is no guarding or rebound  Musculoskeletal:      Right lower leg: Edema (1+ pitting) present  Left lower leg: Edema (1+ pitting) present  Skin:     General: Skin is warm and dry  Coloration: Skin is not jaundiced or pale  Neurological:      Mental Status: He is alert and oriented to person, place, and time     Psychiatric:         Mood and Affect: Mood normal          Behavior: Behavior normal         Additional Data:     Labs:  Results from last 7 days   Lab Units 01/02/23  0519   WBC Thousand/uL 9 39   HEMOGLOBIN g/dL 13 2   HEMATOCRIT % 39 3   PLATELETS Thousands/uL 194   NEUTROS PCT % 73   LYMPHS PCT % 15   MONOS PCT % 10   EOS PCT % 2     Results from last 7 days   Lab Units 01/02/23  0519 01/01/23  0441 12/31/22  0523   SODIUM mmol/L 141   < > 139   POTASSIUM mmol/L 3 7   < > 3 7   CHLORIDE mmol/L 112*   < > 105   CO2 mmol/L 23   < > 25   BUN mg/dL 11   < > 13   CREATININE mg/dL 1 21   < > 1 35*   ANION GAP mmol/L 6   < > 9   CALCIUM mg/dL 8 0*   < > 8 1*   ALBUMIN g/dL  --   --  3 0*   TOTAL BILIRUBIN mg/dL  --   --  0 80   ALK PHOS U/L  --   --  60   ALT U/L  --   --  10   AST U/L  --   --  15   GLUCOSE RANDOM mg/dL 103   < > 111    < > = values in this interval not displayed  Results from last 7 days   Lab Units 12/31/22  0105   INR  1 50*             Results from last 7 days   Lab Units 01/02/23  0519 01/01/23  0441 12/31/22  0105   LACTIC ACID mmol/L  --   --  1 4   PROCALCITONIN ng/ml 4 27* 6 10* 4 39*       Lines/Drains:  Invasive Devices     Peripheral Intravenous Line  Duration           Peripheral IV 12/31/22 Right;Dorsal (posterior) Hand 2 days                      Imaging: Reviewed radiology reports from this admission including: abdominal/pelvic CT    Recent Cultures (last 7 days):   Results from last 7 days   Lab Units 12/31/22  1046 12/31/22  0120 12/31/22  0105   BLOOD CULTURE   --  No Growth at 48 hrs  No Growth at 48 hrs     URINE CULTURE  30,000-39,000 cfu/ml Pseudomonas aeruginosa*  <10,000 cfu/ml  --   --        Last 24 Hours Medication List:   Current Facility-Administered Medications   Medication Dose Route Frequency Provider Last Rate   • acetaminophen  650 mg Oral Q6H PRN Dipak Sauceda MD     • aspirin  81 mg Oral Daily Wayne Hernández PA-C     • carvedilol  3 125 mg Oral BID With Meals Wayne Hernández PA-C     • cefepime  2,000 mg Intravenous Q12H Jeremiah Cole MD 2,000 mg (01/02/23 1443)   • enoxaparin  40 mg Subcutaneous Daily Wayne Hernández PA-C     • finasteride  5 mg Oral Daily Wayne Hernández PA-C     • pravastatin  60 mg Oral Daily With Tiffanie Cope PA-C     • sodium chloride  100 mL/hr Intravenous Continuous Lien Hewitt  mL/hr (01/02/23 1106)   • tamsulosin  0 4 mg Oral Daily With Dinner Polly Garzon PA-C          Today, Patient Was Seen By: Ade Meek MD    **Please Note: This note may have been constructed using a voice recognition system  **

## 2023-01-02 NOTE — QUICK NOTE
Spoke with wife, Roman Reyes, over the phone to update regarding clinical course and plan of care  All questions and concerns addressed at this time

## 2023-01-02 NOTE — ASSESSMENT & PLAN NOTE
Presents with urinary retention w known BPH, worsening pain, fever  Recent rodriguez placement 12/19  ED removed rodriguez night of admission   - VS: febrile, normotensive  - leukocytosis w/ neutrophil shift, lactate wnl, procal elevated (peak 6 1)  - Ucx growing 30-39k cfu Pseudomonas with good susceptibility to cefepime  -[email or web address]  - CTabd: thick walled urinary bladder consistent with cystitis, air within lumen secondary to catheterization vs gas forming infection, large bladder diverticulum causing urinary stasis/difficulty voiding  - ABX: failed outpatient cephalosporin, received zosyn starting 12/30 to 1/2    Plan:  -Urology on board, appreciate recommendations  -Discontinue zosyn and start cefepime 2g q12h (renal dosing) for better susceptibility profile  Per pharm, 3 days IV beta-lactam is adequate treatment for complicated UTI  -Cannot transition to oral agent, given pt's allergy to levofloxacin ("bad hives")  -Rodriguez recommended per Urology to prevent urinary stasis in bladder diverticulum, however pt declined, would prefer intermittent straight cathing   -Provide pt instruction and supplies for intermittent straight cathing BID, monitor for appropriate technique  -f/u in Uro clinic within 1 week of discharge for further cath supplies and instruction  -Urology to schedule for o/p cystoscopy and transrectal US to guide subsequent operative management, anticipating he will require diverticulectomy and cystorrhaphy

## 2023-01-02 NOTE — ASSESSMENT & PLAN NOTE
Lab Results   Component Value Date    EGFR 60 01/02/2023    EGFR 50 01/01/2023    EGFR 52 12/31/2022    CREATININE 1 21 01/02/2023    CREATININE 1 40 (H) 01/01/2023    CREATININE 1 35 (H) 12/31/2022     - monitor renal function, renal dose medications, maintain normotension, avoid nephrotoxic agents

## 2023-01-02 NOTE — QUICK NOTE
Patient reports he takes Alfuzosin 10mg daily at home  I verified through Chart Review; documented by [place] on 12/31/2022  Patient is on Tamsulosin 0 4mg daily with dinner in the hospital   Will continue Tamsulosin at this time  Patient informed

## 2023-01-03 ENCOUNTER — TELEPHONE (OUTPATIENT)
Dept: OTHER | Facility: HOSPITAL | Age: 73
End: 2023-01-03

## 2023-01-03 ENCOUNTER — TELEPHONE (OUTPATIENT)
Dept: UROLOGY | Facility: CLINIC | Age: 73
End: 2023-01-03

## 2023-01-03 PROBLEM — N32.3 BLADDER DIVERTICULUM: Status: ACTIVE | Noted: 2023-01-03

## 2023-01-03 PROBLEM — E87.8 HYPERCHLOREMIA: Status: ACTIVE | Noted: 2023-01-03

## 2023-01-03 LAB
ANION GAP SERPL CALCULATED.3IONS-SCNC: 5 MMOL/L (ref 4–13)
ATRIAL RATE: 69 BPM
BASOPHILS # BLD AUTO: 0.03 THOUSANDS/ÂΜL (ref 0–0.1)
BASOPHILS NFR BLD AUTO: 1 % (ref 0–1)
BUN SERPL-MCNC: 9 MG/DL (ref 5–25)
CALCIUM SERPL-MCNC: 8.1 MG/DL (ref 8.4–10.2)
CHLORIDE SERPL-SCNC: 113 MMOL/L (ref 96–108)
CO2 SERPL-SCNC: 22 MMOL/L (ref 21–32)
CREAT SERPL-MCNC: 1.1 MG/DL (ref 0.6–1.3)
EOSINOPHIL # BLD AUTO: 0.3 THOUSAND/ÂΜL (ref 0–0.61)
EOSINOPHIL NFR BLD AUTO: 7 % (ref 0–6)
ERYTHROCYTE [DISTWIDTH] IN BLOOD BY AUTOMATED COUNT: 13.3 % (ref 11.6–15.1)
GFR SERPL CREATININE-BSD FRML MDRD: 67 ML/MIN/1.73SQ M
GLUCOSE SERPL-MCNC: 96 MG/DL (ref 65–140)
HCT VFR BLD AUTO: 37.1 % (ref 36.5–49.3)
HGB BLD-MCNC: 12.1 G/DL (ref 12–17)
IMM GRANULOCYTES # BLD AUTO: 0.02 THOUSAND/UL (ref 0–0.2)
IMM GRANULOCYTES NFR BLD AUTO: 1 % (ref 0–2)
LYMPHOCYTES # BLD AUTO: 1.39 THOUSANDS/ÂΜL (ref 0.6–4.47)
LYMPHOCYTES NFR BLD AUTO: 32 % (ref 14–44)
MCH RBC QN AUTO: 28.4 PG (ref 26.8–34.3)
MCHC RBC AUTO-ENTMCNC: 32.6 G/DL (ref 31.4–37.4)
MCV RBC AUTO: 87 FL (ref 82–98)
MONOCYTES # BLD AUTO: 0.59 THOUSAND/ÂΜL (ref 0.17–1.22)
MONOCYTES NFR BLD AUTO: 14 % (ref 4–12)
NEUTROPHILS # BLD AUTO: 2.05 THOUSANDS/ÂΜL (ref 1.85–7.62)
NEUTS SEG NFR BLD AUTO: 45 % (ref 43–75)
NRBC BLD AUTO-RTO: 0 /100 WBCS
P AXIS: 52 DEGREES
PLATELET # BLD AUTO: 219 THOUSANDS/UL (ref 149–390)
PMV BLD AUTO: 10.2 FL (ref 8.9–12.7)
POTASSIUM SERPL-SCNC: 3.6 MMOL/L (ref 3.5–5.3)
PR INTERVAL: 158 MS
QRS AXIS: -3 DEGREES
QRSD INTERVAL: 86 MS
QT INTERVAL: 448 MS
QTC INTERVAL: 480 MS
RBC # BLD AUTO: 4.26 MILLION/UL (ref 3.88–5.62)
SODIUM SERPL-SCNC: 140 MMOL/L (ref 135–147)
T WAVE AXIS: -49 DEGREES
VENTRICULAR RATE: 69 BPM
WBC # BLD AUTO: 4.38 THOUSAND/UL (ref 4.31–10.16)

## 2023-01-03 RX ORDER — FINASTERIDE 5 MG/1
5 TABLET, FILM COATED ORAL DAILY
Qty: 30 TABLET | Refills: 2 | Status: CANCELLED | OUTPATIENT
Start: 2023-01-03 | End: 2023-04-03

## 2023-01-03 RX ORDER — FINASTERIDE 5 MG/1
5 TABLET, FILM COATED ORAL DAILY
Qty: 90 TABLET | Refills: 0 | Status: SHIPPED | OUTPATIENT
Start: 2023-01-03 | End: 2023-04-03

## 2023-01-03 RX ADMIN — ASPIRIN 81 MG: 81 TABLET, COATED ORAL at 09:07

## 2023-01-03 RX ADMIN — FINASTERIDE 5 MG: 5 TABLET, FILM COATED ORAL at 09:07

## 2023-01-03 RX ADMIN — PRAVASTATIN SODIUM 60 MG: 40 TABLET ORAL at 16:20

## 2023-01-03 RX ADMIN — SODIUM CHLORIDE 100 ML/HR: 0.9 INJECTION, SOLUTION INTRAVENOUS at 12:16

## 2023-01-03 RX ADMIN — TAMSULOSIN HYDROCHLORIDE 0.4 MG: 0.4 CAPSULE ORAL at 16:20

## 2023-01-03 RX ADMIN — CEFEPIME 2000 MG: 2 INJECTION, POWDER, FOR SOLUTION INTRAVENOUS at 02:17

## 2023-01-03 RX ADMIN — SODIUM CHLORIDE 100 ML/HR: 0.9 INJECTION, SOLUTION INTRAVENOUS at 00:27

## 2023-01-03 RX ADMIN — CARVEDILOL 3.12 MG: 3.12 TABLET, FILM COATED ORAL at 16:20

## 2023-01-03 RX ADMIN — CEFEPIME 2000 MG: 2 INJECTION, POWDER, FOR SOLUTION INTRAVENOUS at 13:43

## 2023-01-03 RX ADMIN — CARVEDILOL 3.12 MG: 3.12 TABLET, FILM COATED ORAL at 09:07

## 2023-01-03 NOTE — TELEPHONE ENCOUNTER
Staff message    Raz Molina MD  [address] [place] [address],     Please get this man on for me for 1/11/22 for cysto and TRUS, thank you

## 2023-01-03 NOTE — TELEPHONE ENCOUNTER
Florina Samson is a 21-year-old male, multiple episodes of urinary retention with emergency room visits since November  He is scheduled for office visit 1/6  Unfortunately, patient was admitted to [facility] with  sepsis before he could make his appointment  He declined any additional Hobbs catheter insertions  During hospitalization, nursing staff have instructed patient on CIC  The plan moving forward:  Patient will be given some supplies for several days to continue intermittent catheterization at home after 24 hours of teaching in the hospital   He will be discharged by the internal medicine service with full antimicrobial course  He was instructed to keep patient's visit 1/6 at over 800 with Charlie Lance for symptom reassessment  We will evaluate patient's ability to demonstrate intermittent catheterization proper technique  He will require urology nurse assistance with obtaining catheter supplies at home  This is imperative  For what ever reason, patient was scheduled for the afternoon with the nurse  Perhaps nurse can use this time to get patient supplies together and continue/reiterate CIC skills  Thank you

## 2023-01-03 NOTE — PLAN OF CARE
CASSANDRA    **Per patient message on 10-**    Pt called on 10- at 9:54 am needing to return a call.     Best contact for pt:  PH: [phone]      Thank you,  Joslyn SNYDER    Problem: GENITOURINARY - ADULT  Goal: Maintains or returns to baseline urinary function  Description: INTERVENTIONS:  - Assess urinary function  - Encourage oral fluids to ensure adequate hydration if ordered  - Administer IV fluids as ordered to ensure adequate hydration  - Administer ordered medications as needed  - Offer frequent toileting  - Follow urinary retention protocol if ordered  Outcome: Progressing

## 2023-01-03 NOTE — TELEPHONE ENCOUNTER
----- Message from Robin Castaneda MD sent at 12/31/2022 11:17 AM EST -----  Regarding: please get him on for 1/11  Hello,    Please get this man on for me for 1/11/22 for cysto and TRUS, thank you

## 2023-01-03 NOTE — TELEPHONE ENCOUNTER
Called and spoke to patient  Per patient, he is learning CIC in the hospital in about one hour  Informed patient I spoke to Dr Kay Fairly and we were able to get patient in for 1/11/23 at 2pm for cystoscopy and transrectal ultrasound  Explained in detail of the office procedure  Patient's appointment with Scott Guallpa on Friday was cancelled and will see only the office nurse at 1pm to assess CIC and provide catheter samples  At visit with Dr Eliza Fox catheter orders may be ordered if needed  Informed patient, appointments would print out on AVS after d/c from hospital  Patient verbalized understanding

## 2023-01-03 NOTE — ASSESSMENT & PLAN NOTE
Suspect iatrogenic in the setting of NS IVF hydration    D/C IVF as pt taking adequate PO  Monitor BMP

## 2023-01-03 NOTE — PLAN OF CARE
Problem: INFECTION - ADULT  Goal: Absence or prevention of progression during hospitalization  Description: INTERVENTIONS:  - Assess and monitor for signs and symptoms of infection  - Monitor lab/diagnostic results  - Monitor all insertion sites, i e  indwelling lines, tubes, and drains  - [place] appropriate cooling/warming therapies per order  - Administer medications as ordered  - Instruct and encourage patient and family to use good hand hygiene technique  - Identify and instruct in appropriate isolation precautions for identified infection/condition  Outcome: Progressing     Problem: GENITOURINARY - ADULT  Goal: Maintains or returns to baseline urinary function  Description: INTERVENTIONS:  - Assess urinary function  - Encourage oral fluids to ensure adequate hydration if ordered  - Administer IV fluids as ordered to ensure adequate hydration  - Administer ordered medications as needed  - Offer frequent toileting  - Follow urinary retention protocol if ordered  Outcome: Progressing

## 2023-01-03 NOTE — PROGRESS NOTES
[facility]  Progress Note - Rena Lemus 1950, 81 y o  male MRN: [id number]  Unit/Bed#: S -01 Encounter: [id number]  Primary Care Provider: Kathryn Markham MD   Date and time admitted to hospital: 12/31/2022 12:17 AM    * Sepsis due to Pseudomonas UTI [facility])  Assessment & Plan  Presents with urinary retention w known BPH, worsening pain, fever  Recent rodriguez placement 12/19  ED removed rodriguez night of admission   - VS: febrile, normotensive  - leukocytosis w/ neutrophil shift, lactate wnl, procal elevated (peak 6 1)  - Ucx growing 30-39k cfu Pseudomonas with good susceptibility to cefepime  -[email or web address]  - CTabd: thick walled urinary bladder consistent with cystitis, air within lumen secondary to catheterization vs gas forming infection, large bladder diverticulum causing urinary stasis/difficulty voiding  - ABX: failed outpatient cefpodoxime/cephalexin  received zosyn from 12/30 to 1/2 w clinical improvement    Pt clinically improved on IV abx, sepsis resolved  Afebrile  WBC wnl  Plan:  -Urology on board, appreciate recommendations  -Started cefepime 2g q12h (renal dosing) for better susceptibility profile on 1/2/23  Per pharm, 3 days IV beta-lactam is adequate treatment for complicated UTI  -Cannot transition to oral agent, given pt's allergy to levofloxacin ("bad hives")  -Rodriguez recommended per Urology to prevent urinary stasis in bladder diverticulum, however pt declined, would prefer intermittent straight cathing   -Provide pt instruction and supplies for intermittent straight cathing BID, monitor for appropriate technique  -f/u in Uro clinic within 1 week of discharge for further cath supplies and instruction  -Urology to schedule for o/p cystoscopy and transrectal US to guide subsequent operative management, anticipating he will require diverticulectomy and cystorrhaphy  Acute urinary retention  Assessment & Plan  Urinary retention with known BPH   Rodriguez removed by ED on presentation for voiding trial  This admission, CT A/P shows thick-walled urinary bladder consistent with cystitis and large bladder diverticulum    urology on board, recommendations appreciated  Continue urinary retention protocol with serial PVRs  Restart patient's Flomax and Proscar   As above, pt declining Hobbs; instruct pt in intermittent self-cath BID and monitor for approp technique  Will need f/u with Uro o/p as above    Bladder diverticulum  Assessment & Plan  Found on CT A/P    Plan as above under sepsis d/t UTI & urinary retention    Hyperchloremia  Assessment & Plan  Suspect iatrogenic in the setting of NS IVF hydration    D/C IVF as pt taking adequate PO  Monitor BMP    Stage 3a chronic kidney disease [facility])  Assessment & Plan  Lab Results   Component Value Date    EGFR 67 01/03/2023    EGFR 60 01/02/2023    EGFR 50 01/01/2023    CREATININE 1 10 01/03/2023    CREATININE 1 21 01/02/2023    CREATININE 1 40 (H) 01/01/2023     - monitor renal function, renal dose medications, maintain normotension, avoid nephrotoxic agents      VTE Pharmacologic Prophylaxis: VTE Score: 5 High Risk (Score >/= 5) - Pharmacological DVT Prophylaxis Ordered: enoxaparin (Lovenox)  Sequential Compression Devices Ordered  Patient Centered Rounds: I performed bedside rounds with nursing staff today  Discussions with Specialists or Other Care Team Provider: None    Education and Discussions with Family / Patient: Will call pt contact if not seen at bedside  Current Length of Stay: 3 day(s)  Current Patient Status: Inpatient   Discharge Plan: Anticipate discharge in 48 hrs to home  Code Status: Level 1 - Full Code    Subjective: This morning, Mr Anais Strickland is seen lying up in bed, alert, engaged, and in no acute distress  He reports feeling well this morning and has no acute complaints  Reports that dysuria is resolved    Denies fevers, chills, sweats, chest pain, shortness of breath, abdominal pain, nausea, vomiting, flank pain, swelling in the legs or feet  Reports voiding volitionally and straight cathing intermittently  No new or worsening symptoms  Objective:     Vitals:   Temp (24hrs), Av 1 °F (36 7 °C), Min:98 °F (36 7 °C), Max:98 2 °F (36 8 °C)    Temp:  [98 °F (36 7 °C)-98 2 °F (36 8 °C)] 98 2 °F (36 8 °C)  HR:  [33-70] 70  Resp:  [17-18] 18  BP: (122-148)/(78-88) 122/78  SpO2:  [92 %-94 %] 93 %  There is no height or weight on file to calculate BMI  Input and Output Summary (last 24 hours): Intake/Output Summary (Last 24 hours) at 1/3/2023 1721  Last data filed at 1/3/2023 1651  Gross per 24 hour   Intake 1240 ml   Output 1100 ml   Net 140 ml       Physical Exam:   Physical Exam  Vitals and nursing note reviewed  Constitutional:       General: He is not in acute distress  Appearance: Normal appearance  He is obese  He is not ill-appearing, toxic-appearing or diaphoretic  HENT:      Head: Normocephalic and atraumatic  Eyes:      General: No scleral icterus  Right eye: No discharge  Left eye: No discharge  Conjunctiva/sclera: Conjunctivae normal    Cardiovascular:      Rate and Rhythm: Normal rate and regular rhythm  Pulses: Normal pulses  Heart sounds: Normal heart sounds  No murmur heard  No friction rub  No gallop  Pulmonary:      Effort: Pulmonary effort is normal  No respiratory distress  Breath sounds: Normal breath sounds  No stridor  No wheezing, rhonchi or rales  Chest:      Chest wall: No tenderness  Abdominal:      General: Bowel sounds are normal  There is no distension  Palpations: Abdomen is soft  Tenderness: There is no abdominal tenderness  There is no guarding or rebound  Musculoskeletal:         General: No tenderness  Right lower leg: No edema  Left lower leg: No edema  Skin:     General: Skin is warm and dry  Neurological:      Mental Status: He is alert and oriented to person, place, and time  Psychiatric:         Mood and Affect: Mood normal          Behavior: Behavior normal         Additional Data:     Labs:  Results from last 7 days   Lab Units 01/03/23  0621   WBC Thousand/uL 4 38   HEMOGLOBIN g/dL 12 1   HEMATOCRIT % 37 1   PLATELETS Thousands/uL 219   NEUTROS PCT % 45   LYMPHS PCT % 32   MONOS PCT % 14*   EOS PCT % 7*     Results from last 7 days   Lab Units 01/03/23  0621 01/01/23  0441 12/31/22  0523   SODIUM mmol/L 140   < > 139   POTASSIUM mmol/L 3 6   < > 3 7   CHLORIDE mmol/L 113*   < > 105   CO2 mmol/L 22   < > 25   BUN mg/dL 9   < > 13   CREATININE mg/dL 1 10   < > 1 35*   ANION GAP mmol/L 5   < > 9   CALCIUM mg/dL 8 1*   < > 8 1*   ALBUMIN g/dL  --   --  3 0*   TOTAL BILIRUBIN mg/dL  --   --  0 80   ALK PHOS U/L  --   --  60   ALT U/L  --   --  10   AST U/L  --   --  15   GLUCOSE RANDOM mg/dL 96   < > 111    < > = values in this interval not displayed  Results from last 7 days   Lab Units 12/31/22  0105   INR  1 50*             Results from last 7 days   Lab Units 01/02/23  0519 01/01/23  0441 12/31/22  0105   LACTIC ACID mmol/L  --   --  1 4   PROCALCITONIN ng/ml 4 27* 6 10* 4 39*       Lines/Drains:  Invasive Devices     Peripheral Intravenous Line  Duration           Peripheral IV 01/03/23 Dorsal (posterior); Left Forearm <1 day                  Telemetry:  Telemetry Orders (From admission, onward)             48 Hour Telemetry Monitoring  Continuous x 48 hours        References:    Telemetry Guidelines   Question:  Reason for 48 Hour Telemetry  Answer:  Arrhythmias Requiring Medical Therapy (eg  SVT, Vtach/fib, Bradycardia, Uncontrolled A-fib)                 Telemetry Reviewed: Sinus rhythm with frequent premature ventricular complexes in a pattern of bigeminy  No true episodes of bradycardia or tachycardia  Indication for Continued Telemetry Use: No indication for continued use  Will discontinue  Imaging: No pertinent imaging reviewed      Recent Cultures (last 7 days):   Results from last 7 days   Lab Units 12/31/22  1046 12/31/22  0120 12/31/22  0105   BLOOD CULTURE   --  No Growth at 72 hrs  No Growth at 72 hrs  URINE CULTURE  30,000-39,000 cfu/ml Pseudomonas aeruginosa*  <10,000 cfu/ml  --   --        Last 24 Hours Medication List:   Current Facility-Administered Medications   Medication Dose Route Frequency Provider Last Rate   • acetaminophen  650 mg Oral Q6H PRN Trevor Peoples MD     • aspirin  81 mg Oral Daily Susie Kyle PA-C     • carvedilol  3 125 mg Oral BID With Meals Susie Kyle PA-C     • cefepime  2,000 mg Intravenous Q12H Mary Collins MD 2,000 mg (01/03/23 1343)   • enoxaparin  40 mg Subcutaneous Daily Susie Kyle PA-C     • finasteride  5 mg Oral Daily Susie Kyle PA-C     • pravastatin  60 mg Oral Daily With Michvalerie Tellez PA-C     • tamsulosin  0 4 mg Oral Daily With Dinner Susie Kyle PA-C          Today, Patient Was Seen By: Mary Collins MD    **Please Note: This note may have been constructed using a voice recognition system  **

## 2023-01-03 NOTE — ASSESSMENT & PLAN NOTE
Lab Results   Component Value Date    EGFR 67 01/03/2023    EGFR 60 01/02/2023    EGFR 50 01/01/2023    CREATININE 1 10 01/03/2023    CREATININE 1 21 01/02/2023    CREATININE 1 40 (H) 01/01/2023     - monitor renal function, renal dose medications, maintain normotension, avoid nephrotoxic agents

## 2023-01-03 NOTE — ASSESSMENT & PLAN NOTE
Presents with urinary retention w known BPH, worsening pain, fever  Recent rodriguez placement 12/19  ED removed rodriguez night of admission   - VS: febrile, normotensive  - leukocytosis w/ neutrophil shift, lactate wnl, procal elevated (peak 6 1)  - Ucx growing 30-39k cfu Pseudomonas with good susceptibility to cefepime  -[email or web address]  - CTabd: thick walled urinary bladder consistent with cystitis, air within lumen secondary to catheterization vs gas forming infection, large bladder diverticulum causing urinary stasis/difficulty voiding  - ABX: failed outpatient cefpodoxime/cephalexin  received zosyn from 12/30 to 1/2 w clinical improvement    Pt clinically improved on IV abx, sepsis resolved  Afebrile  WBC wnl  Plan:  -Urology on board, appreciate recommendations  -Started cefepime 2g q12h (renal dosing) for better susceptibility profile on 1/2/23  Per pharm, 3 days IV beta-lactam is adequate treatment for complicated UTI  -Cannot transition to oral agent, given pt's allergy to levofloxacin ("bad hives")  -Rodriguez recommended per Urology to prevent urinary stasis in bladder diverticulum, however pt declined, would prefer intermittent straight cathing   -Provide pt instruction and supplies for intermittent straight cathing BID, monitor for appropriate technique  -f/u in Uro clinic within 1 week of discharge for further cath supplies and instruction  -Urology to schedule for o/p cystoscopy and transrectal US to guide subsequent operative management, anticipating he will require diverticulectomy and cystorrhaphy

## 2023-01-03 NOTE — UTILIZATION REVIEW
Initial Clinical Review    Admission: Date/Time/Statement:   Admission Orders (From admission, onward)     Ordered        12/31/22 0329  INPATIENT ADMISSION  Once                      Orders Placed This Encounter   Procedures   • INPATIENT ADMISSION     Standing Status:   Standing     Number of Occurrences:   1     Order Specific Question:   Level of Care     Answer:   Med Surg [16]     Order Specific Question:   Estimated length of stay     Answer:   More than 2 Midnights     Order Specific Question:   Certification     Answer:   I certify that inpatient services are medically necessary for this patient for a duration of greater than two midnights  See H&P and MD Progress Notes for additional information about the patient's course of treatment  ED Arrival Information     Expected   -    Arrival   12/31/2022 00:11    Acuity   Urgent            Means of arrival   Wheelchair    Escorted by   Spouse    Service   Hospitalist    Admission type   Emergency            Arrival complaint   Urinary Catheter problem           Chief Complaint   Patient presents with   • Flank Pain     Left sided flank pain  Patient received a rodriguez on a recent admission  Was seen at [facility] today and was told he has a kidney infection          Initial Presentation: 81 y o  male PMH htn, CKD3a,  bph w recent episode of Urinary retention admit req Rodriguez placement antibx completion for suspect UTI waiting on OP Urology for void trial , recent admit to nearby [address] on PO Keflex for fever, SOB, low back pain to ED w Spouse reports w fever, sev ABD paiin/ back pain demanding Rodriguez removal   Reports discomfort w Rodriguez since it was place    EXAM  Acute distress & incolsolable; Labs  Abn UA; leukocytosis, elevated CR, CT c/abd reveal findings for Cystitis w large bladder diverticulum, + stevo LE edema  Inpatient admission due to Sepsis due to GRAM Neg UTI, acute Urinary retention  OP Antibx failure cont w IV Zosyn, narrow following cultures; Rodriguez removed by ED staff for void trial if failure the urinary retention protocol/ bladder scan & consult Urology Cont home meds    Urology   Films review for thickened bladder wall and a bladder diverticulum along with an enlarged prostate  The bladder diverticulum acts as a pop-off valve for the obstructed bladder outlet and makes emptying difficult  He will require a cystoscopy and transrectal ultrasound to aid in operative planning and will likely require a diverticulectomy at some point as well with cystorrhaphy  Rec cont  both finasteride and tamsulosin at this time , rodriguez catheter is recommended  RN Note  Bladder scanned patient for 490 mL's at 2100  Order from urology says to place a rodriguez catheter if patient is retaining over 450 mL of urine in bladder  Patient is refusing, order for straight cath  Date:  1/1/2023   Day 2:   Attending  Reports less abdominal pain and nausea  Still awaiting urine culture  Tolerate antibiotics without issue  Required straight catheterization x1    Inquiring whether he could be taught straight catheterization rather than indwelling Rodriguez, resistant to leave w Rodriguez in place  ED Triage Vitals [12/31/22 0021]   Temperature Pulse Respirations Blood Pressure SpO2   (!) 101 4 °F (38 6 °C) 63 20 150/93 97 %      Temp Source Heart Rate Source Patient Position - Orthostatic VS BP Location FiO2 (%)   Oral Monitor Sitting Right arm --      Pain Score       10 - Worst Possible Pain          Wt Readings from Last 1 Encounters:   12/21/22 85 kg (187 lb 6 3 oz)     Additional Vital Signs:   Date/Time Temp Pulse Resp BP MAP (mmHg) SpO2 O2 Device Patient Position - Orthostatic VS   01/01/23 1549 -- 80 -- -- -- -- -- --   01/01/23 1522 99 °F (37 2 °C) 40 Abnormal  18 110/82 -- 94 % None (Room air) Lying   01/01/23 0700 98 3 °F (36 8 °C) 69 16 127/68 -- 91 % None (Room air) Lying   12/31/22 2148 100 °F (37 8 °C) 72 16 100/54 72 93 % None (Room air) Lying   12/31/22 1700 -- -- -- -- -- -- None (Room air) --   12/31/22 1500 99 °F (37 2 °C) 61 14 97/57 73 93 % None (Room air) Lying   12/31/22 0857 -- -- -- 100/48 Abnormal  -- -- -- Lying   12/31/22 0700 99 1 °F (37 3 °C) 73 16 84/41 Abnormal  58 Abnormal  94 % -- Lying   12/31/22 0450 -- -- -- -- -- -- None (Room air) --   12/31/22 0404 99 1 °F (37 3 °C) -- -- -- -- -- -- --   12/31/22 0021 101 4 °F (38 6 °C) Abnormal  63 20 150/93 -- 97 % None (Room air) Sitting       Pertinent Labs/Diagnostic Test Results:   CT chest abdomen pelvis w contrast   Final Result by Darrian Rivera MD (12/31 3567)      1  Thick-walled urinary bladder consistent with cystitis  Air within the lumen may be achievable to recent catheterization or gas forming infection  2   Large bladder diverticulum      XR chest 1 view portable   Final Result by Tamela Love MD (12/31 1423)      No acute cardiopulmonary disease           Results from last 7 days   Lab Units 01/03/23  0621 01/02/23  0519 01/01/23 0441 12/31/22  0523 12/31/22  0105   WBC Thousand/uL 4 38 9 39 14 28* 13 71* 16 90*   HEMOGLOBIN g/dL 12 1 13 2 12 7 12 5 14 7   HEMATOCRIT % 37 1 39 3 37 8 37 0 42 3   PLATELETS Thousands/uL 219 194 177 182 217   NEUTROS ABS Thousands/µL 2 05 6 73 11 69*  --  14 63*         Results from last 7 days   Lab Units 01/03/23  0621 01/02/23  0519 01/01/23 0441 12/31/22  0523 12/31/22  0105   SODIUM mmol/L 140 141 139 139 139   POTASSIUM mmol/L 3 6 3 7 3 5 3 7 4 0   CHLORIDE mmol/L 113* 112* 108 105 104   CO2 mmol/L 22 23 24 25 22   ANION GAP mmol/L 5 6 7 9 13   BUN mg/dL 9 11 13 13 12   CREATININE mg/dL 1 10 1 21 1 40* 1 35* 1 32*   EGFR ml/min/1 73sq m 67 60 50 52 54   CALCIUM mg/dL 8 1* 8 0* 7 9* 8 1* 9 1   MAGNESIUM mg/dL  --  1 9  --   --   --      Results from last 7 days   Lab Units 12/31/22  0523 12/31/22  0105   AST U/L 15 19   ALT U/L 10 14   ALK PHOS U/L 60 76   TOTAL PROTEIN g/dL 5 4* 6 9   ALBUMIN g/dL 3 0* 3 8   TOTAL BILIRUBIN mg/dL 0 80 0 72         Results from last 7 days   Lab Units 01/03/23  0621 01/02/23  0519 01/01/23  0441 12/31/22  0523 12/31/22  0105   GLUCOSE RANDOM mg/dL 96 103 99 111 122             No results found for: BETA-HYDROXYBUTYRATE                   Results from last 7 days   Lab Units 12/31/22  0316 12/31/22  0105   HS TNI 0HR ng/L  --  27   HS TNI 2HR ng/L 28  --    HSTNI D2 ng/L 1  --          Results from last 7 days   Lab Units 12/31/22  0105   PROTIME seconds 18 3*   INR  1 50*   PTT seconds 29         Results from last 7 days   Lab Units 01/02/23  0519 01/01/23  0441 12/31/22  0105   PROCALCITONIN ng/ml 4 27* 6 10* 4 39*     Results from last 7 days   Lab Units 12/31/22  0105   LACTIC ACID mmol/L 1 4             Results from last 7 days   Lab Units 12/31/22  0105   BNP pg/mL 357*                             Results from last 7 days   Lab Units 12/31/22  1046   CLARITY UA  Turbid   COLOR UA  Light Yellow   SPEC GRAV UA  1 045*   PH UA  7 5   GLUCOSE UA mg/dl Negative   KETONES UA mg/dl Negative   BLOOD UA  Small*   PROTEIN UA mg/dl 30 (1+)*   NITRITE UA  Negative   BILIRUBIN UA  Negative   UROBILINOGEN UA (BE) mg/dl <2 0   LEUKOCYTES UA  Large*   WBC UA /hpf Innumerable*   RBC UA /hpf 20-30*   BACTERIA UA /hpf None Seen   EPITHELIAL CELLS WET PREP /hpf None Seen   MUCUS THREADS  Occasional*         Results from last 7 days   Lab Units 12/31/22  1046 12/31/22  0120 12/31/22  0105   BLOOD CULTURE   --  No Growth at 72 hrs  No Growth at 72 hrs     URINE CULTURE  30,000-39,000 cfu/ml Pseudomonas aeruginosa*  <10,000 cfu/ml  --   --          ED Treatment:   Medication Administration from 12/31/2022 0011 to 12/31/2022 0436       Date/Time Order Dose Route Action     12/31/2022 0125 EST multi-electrolyte (ISOLYTE-S PH 7 4) bolus 500 mL 500 mL Intravenous New Bag     12/31/2022 0126 EST acetaminophen (TYLENOL) tablet 975 mg 975 mg Oral Given     12/31/2022 0126 EST HYDROmorphone (DILAUDID) injection 0 5 mg 0 5 mg Intravenous Given     12/31/2022 0126 EST ondansetron (ZOFRAN) injection 4 mg 4 mg Intravenous Given     12/31/2022 0330 EST piperacillin-tazobactam (ZOSYN) 3 375 g in sodium chloride 0 9 % 100 mL IVPB 3 375 g Intravenous New Bag        Past Medical History:   Diagnosis Date   • BPH (benign prostatic hyperplasia)    • Hx of CABG    • Hypercholesterolemia    • Hypertension      Present on Admission:  **None**      Admitting Diagnosis: Bladder diverticulum [N32 3]  Cystitis [N30 90]  Flank pain [R10 9]  Acute urinary retention [R33 8]  Age/Sex: 81 y o  male  Admission Orders:  NC FOR POX =/>  92%  URINARY RETENTION PROTOCOL     Scheduled Medications:  aspirin, 81 mg, Oral, Daily  carvedilol, 3 125 mg, Oral, BID With Meals  enoxaparin, 40 mg, Subcutaneous, Daily  finasteride, 5 mg, Oral, Daily  piperacillin-tazobactam, 3 375 g, Intravenous, Q6H  pravastatin, 60 mg, Oral, Daily With Dinner  tamsulosin, 0 4 mg, Oral, Daily With Dinner      Continuous IV Infusions:  sodium chloride, 100 mL/hr, Intravenous, Continuous      PRN Meds:  acetaminophen, 650 mg, Oral, Q6H PRN        IP CONSULT TO UROLOGY    Network Utilization Review Department  ATTENTION: Please call with any questions or concerns to [phone] and carefully listen to the prompts so that you are directed to the right person  All voicemails are confidential   Ty Limb all requests for admission clinical reviews, approved or denied determinations and any other requests to dedicated fax number below belonging to the campus where the patient is receiving treatment   List of dedicated fax numbers for the Facilities:  [address] DENIALS (Administrative/Medical Necessity) [phone]   [address] [place] [address] (Maternity/NICU/Pediatrics) [phone]   [address] [phone]   [facility] [phone]   [facility] [phone]   [facility]7 [address] [place] [phone]   [address] 28 [facility] 310 [facility] 134 815 [facility] [phone]

## 2023-01-04 PROBLEM — E87.8 HYPERCHLOREMIA: Status: RESOLVED | Noted: 2023-01-03 | Resolved: 2023-01-04

## 2023-01-04 LAB
ANION GAP SERPL CALCULATED.3IONS-SCNC: 6 MMOL/L (ref 4–13)
BASOPHILS # BLD AUTO: 0.04 THOUSANDS/ÂΜL (ref 0–0.1)
BASOPHILS NFR BLD AUTO: 1 % (ref 0–1)
BUN SERPL-MCNC: 10 MG/DL (ref 5–25)
CALCIUM SERPL-MCNC: 9.1 MG/DL (ref 8.4–10.2)
CHLORIDE SERPL-SCNC: 108 MMOL/L (ref 96–108)
CO2 SERPL-SCNC: 25 MMOL/L (ref 21–32)
CREAT SERPL-MCNC: 1.08 MG/DL (ref 0.6–1.3)
EOSINOPHIL # BLD AUTO: 0.25 THOUSAND/ÂΜL (ref 0–0.61)
EOSINOPHIL NFR BLD AUTO: 5 % (ref 0–6)
ERYTHROCYTE [DISTWIDTH] IN BLOOD BY AUTOMATED COUNT: 13.1 % (ref 11.6–15.1)
GFR SERPL CREATININE-BSD FRML MDRD: 69 ML/MIN/1.73SQ M
GLUCOSE SERPL-MCNC: 117 MG/DL (ref 65–140)
HCT VFR BLD AUTO: 40.6 % (ref 36.5–49.3)
HGB BLD-MCNC: 13.5 G/DL (ref 12–17)
IMM GRANULOCYTES # BLD AUTO: 0.04 THOUSAND/UL (ref 0–0.2)
IMM GRANULOCYTES NFR BLD AUTO: 1 % (ref 0–2)
LYMPHOCYTES # BLD AUTO: 1.34 THOUSANDS/ÂΜL (ref 0.6–4.47)
LYMPHOCYTES NFR BLD AUTO: 27 % (ref 14–44)
MCH RBC QN AUTO: 28.4 PG (ref 26.8–34.3)
MCHC RBC AUTO-ENTMCNC: 33.3 G/DL (ref 31.4–37.4)
MCV RBC AUTO: 86 FL (ref 82–98)
MONOCYTES # BLD AUTO: 0.64 THOUSAND/ÂΜL (ref 0.17–1.22)
MONOCYTES NFR BLD AUTO: 13 % (ref 4–12)
NEUTROPHILS # BLD AUTO: 2.64 THOUSANDS/ÂΜL (ref 1.85–7.62)
NEUTS SEG NFR BLD AUTO: 53 % (ref 43–75)
NRBC BLD AUTO-RTO: 0 /100 WBCS
PLATELET # BLD AUTO: 268 THOUSANDS/UL (ref 149–390)
PMV BLD AUTO: 9.9 FL (ref 8.9–12.7)
POTASSIUM SERPL-SCNC: 3.6 MMOL/L (ref 3.5–5.3)
RBC # BLD AUTO: 4.75 MILLION/UL (ref 3.88–5.62)
SODIUM SERPL-SCNC: 139 MMOL/L (ref 135–147)
WBC # BLD AUTO: 4.95 THOUSAND/UL (ref 4.31–10.16)

## 2023-01-04 RX ADMIN — FINASTERIDE 5 MG: 5 TABLET, FILM COATED ORAL at 08:57

## 2023-01-04 RX ADMIN — TAMSULOSIN HYDROCHLORIDE 0.4 MG: 0.4 CAPSULE ORAL at 17:15

## 2023-01-04 RX ADMIN — CEFEPIME 2000 MG: 2 INJECTION, POWDER, FOR SOLUTION INTRAVENOUS at 02:04

## 2023-01-04 RX ADMIN — CARVEDILOL 3.12 MG: 3.12 TABLET, FILM COATED ORAL at 17:15

## 2023-01-04 RX ADMIN — ASPIRIN 81 MG: 81 TABLET, COATED ORAL at 08:57

## 2023-01-04 RX ADMIN — PRAVASTATIN SODIUM 60 MG: 40 TABLET ORAL at 17:15

## 2023-01-04 RX ADMIN — CEFEPIME 2000 MG: 2 INJECTION, POWDER, FOR SOLUTION INTRAVENOUS at 14:43

## 2023-01-04 RX ADMIN — CARVEDILOL 3.12 MG: 3.12 TABLET, FILM COATED ORAL at 08:57

## 2023-01-04 NOTE — ASSESSMENT & PLAN NOTE
Urinary retention with known BPH   Hobbs removed by ED on presentation for voiding trial  This admission, CT A/P shows thick-walled urinary bladder consistent with cystitis and large bladder diverticulum    urology on board, recommendations appreciated  Continue urinary retention protocol with serial PVRs  Restart patient's Flomax and Proscar   As above, pt declining Hobbs; pt instructed in intermittent self-cath BID and performing well  Will need f/u with Uro o/p as above

## 2023-01-04 NOTE — ASSESSMENT & PLAN NOTE
Suspect iatrogenic in the setting of NS IVF hydration    D/C IVF 1/3/23 as pt taking adequate PO  Monitor BMP-->resolved 1/4/23

## 2023-01-04 NOTE — ASSESSMENT & PLAN NOTE
Presents with urinary retention w known BPH, worsening pain, fever  Recent rodriguez placement 12/19  ED removed rodriguez night of admission   - VS: febrile, normotensive  - leukocytosis w/ neutrophil shift, lactate wnl, procal elevated (peak 6 1)  - Ucx growing 30-39k cfu Pseudomonas with good susceptibility to cefepime  -[email or web address]  - CTabd: thick walled urinary bladder consistent with cystitis, air within lumen secondary to catheterization vs gas forming infection, large bladder diverticulum causing urinary stasis/difficulty voiding  - ABX: failed outpatient cefpodoxime/cephalexin  received zosyn from 12/30 to 1/2 w clinical improvement    Pt clinically improved on IV abx, sepsis resolved  Afebrile  WBC wnl  Plan:  -Urology on board, appreciate recommendations  -Started cefepime 2g q12h (renal dosing) for better susceptibility profile on 1/2/23  Per pharm, 3 days IV beta-lactam is adequate treatment for complicated UTI  -Cannot transition to oral agent, given pt's allergy to levofloxacin ("bad hives")  -Rodriguez recommended per Urology to prevent urinary stasis in bladder diverticulum, however pt declined, would prefer intermittent straight cathing   -Provide pt instruction and supplies for intermittent straight cathing BID, monitor for appropriate technique  -f/u in Uro clinic within 1 week of discharge for further cath supplies and instruction  -Urology to schedule for o/p cystoscopy and transrectal US to guide subsequent operative management, anticipating he will require diverticulectomy and cystorrhaphy

## 2023-01-04 NOTE — PROGRESS NOTES
[facility]  Progress Note - Bailey Breaux 1950, 81 y o  male MRN: [id number]  Unit/Bed#: S -01 Encounter: [id number]  Primary Care Provider: Tameka Black MD   Date and time admitted to hospital: 12/31/2022 12:17 AM    * Sepsis due to Pseudomonas UTI [facility])  Assessment & Plan  Presents with urinary retention w known BPH, worsening pain, fever  Recent rodriguez placement 12/19  ED removed rodriguez night of admission   - VS: febrile, normotensive  - leukocytosis w/ neutrophil shift, lactate wnl, procal elevated (peak 6 1)  - Ucx growing 30-39k cfu Pseudomonas with good susceptibility to cefepime  -[email or web address]  - CTabd: thick walled urinary bladder consistent with cystitis, air within lumen secondary to catheterization vs gas forming infection, large bladder diverticulum causing urinary stasis/difficulty voiding  - ABX: failed outpatient cefpodoxime/cephalexin  received zosyn from 12/30 to 1/2 w clinical improvement    Pt clinically improved on IV abx, sepsis resolved  Afebrile  WBC wnl  Plan:  -Urology on board, appreciate recommendations  -Started cefepime 2g q12h (renal dosing) for better susceptibility profile on 1/2/23  Per pharm, 3 days IV beta-lactam is adequate treatment for complicated UTI  -Cannot transition to oral agent, given pt's allergy to levofloxacin ("bad hives")  -Rodriguez recommended per Urology to prevent urinary stasis in bladder diverticulum, however pt declined, would prefer intermittent straight cathing   -Provide pt instruction and supplies for intermittent straight cathing BID, monitor for appropriate technique  -f/u in Uro clinic within 1 week of discharge for further cath supplies and instruction  -Urology to schedule for o/p cystoscopy and transrectal US to guide subsequent operative management, anticipating he will require diverticulectomy and cystorrhaphy  Acute urinary retention  Assessment & Plan  Urinary retention with known BPH   Rodriguez removed by ED on presentation for voiding trial  This admission, CT A/P shows thick-walled urinary bladder consistent with cystitis and large bladder diverticulum    urology on board, recommendations appreciated  Continue urinary retention protocol with serial PVRs  Restart patient's Flomax and Proscar   As above, pt declining Hobbs; pt instructed in intermittent self-cath BID and performing well  Will need f/u with Uro o/p as above    Bladder diverticulum  Assessment & Plan  Found on CT A/P    Plan as above under sepsis d/t UTI & urinary retention    Hyperchloremia-resolved as of 1/4/2023  Assessment & Plan  Suspect iatrogenic in the setting of NS IVF hydration    D/C IVF 1/3/23 as pt taking adequate PO  Monitor BMP-->resolved 1/4/23    Stage 3a chronic kidney disease [facility])  Assessment & Plan  Lab Results   Component Value Date    EGFR 69 01/04/2023    EGFR 67 01/03/2023    EGFR 60 01/02/2023    CREATININE 1 08 01/04/2023    CREATININE 1 10 01/03/2023    CREATININE 1 21 01/02/2023     - monitor renal function, renal dose medications, maintain normotension, avoid nephrotoxic agents      VTE Pharmacologic Prophylaxis: VTE Score: 5 High Risk (Score >/= 5) - Pharmacological DVT Prophylaxis Ordered: enoxaparin (Lovenox)  Sequential Compression Devices Ordered  Patient Centered Rounds: I performed bedside rounds with nursing staff today  Discussions with Specialists or Other Care Team Provider: None    Education and Discussions with Family / Patient: Will call contact if not seen at bedside  Current Length of Stay: 4 day(s)  Current Patient Status: Inpatient   Discharge Plan: Anticipate discharge tomorrow to home  Code Status: Level 1 - Full Code    Subjective: This morning, Mr Alejandro Barker is seen sitting up in bedside chair, awake, alert, engaged w exam, in no acute distress  He reports feeling well this morning, slept overnight, endorses no complaints at this time  Denies fevers, chills, CP, SOB, abd pain, dysuria  Voiding volitionally  Self cath went well at bedtime last night  No new or worsening sxs  Objective:     Vitals:   Temp (24hrs), Av 3 °F (36 8 °C), Min:98 2 °F (36 8 °C), Max:98 6 °F (37 °C)    Temp:  [98 2 °F (36 8 °C)-98 6 °F (37 °C)] 98 2 °F (36 8 °C)  HR:  [48-70] 49  Resp:  [16-20] 16  BP: (120-169)/(62-79) 169/79  SpO2:  [93 %-95 %] 95 %  There is no height or weight on file to calculate BMI  Input and Output Summary (last 24 hours): Intake/Output Summary (Last 24 hours) at 2023 1315  Last data filed at 2023 0330  Gross per 24 hour   Intake 1733 33 ml   Output 2069 ml   Net -335 67 ml       Physical Exam:   Physical Exam  Vitals and nursing note reviewed  Constitutional:       General: He is not in acute distress  Appearance: Normal appearance  He is obese  He is not ill-appearing, toxic-appearing or diaphoretic  HENT:      Head: Normocephalic and atraumatic  Eyes:      General: No scleral icterus  Right eye: No discharge  Left eye: No discharge  Conjunctiva/sclera: Conjunctivae normal    Cardiovascular:      Rate and Rhythm: Normal rate  Rhythm irregular  Pulses: Normal pulses  Heart sounds: Normal heart sounds  No murmur heard  No friction rub  No gallop  Pulmonary:      Effort: Pulmonary effort is normal  No respiratory distress  Breath sounds: Normal breath sounds  No stridor  No wheezing, rhonchi or rales  Chest:      Chest wall: No tenderness  Abdominal:      General: Bowel sounds are normal  There is no distension  Palpations: Abdomen is soft  Tenderness: There is no abdominal tenderness  There is no guarding or rebound  Musculoskeletal:         General: No tenderness  Right lower leg: No edema  Left lower leg: No edema  Skin:     General: Skin is warm and dry  Coloration: Skin is not jaundiced or pale  Neurological:      Mental Status: He is alert and oriented to person, place, and time  Psychiatric:         Mood and Affect: Mood normal          Behavior: Behavior normal        Additional Data:     Labs:  Results from last 7 days   Lab Units 01/04/23  0933   WBC Thousand/uL 4 95   HEMOGLOBIN g/dL 13 5   HEMATOCRIT % 40 6   PLATELETS Thousands/uL 268   NEUTROS PCT % 53   LYMPHS PCT % 27   MONOS PCT % 13*   EOS PCT % 5     Results from last 7 days   Lab Units 01/04/23  0933 01/01/23  0441 12/31/22  0523   SODIUM mmol/L 139   < > 139   POTASSIUM mmol/L 3 6   < > 3 7   CHLORIDE mmol/L 108   < > 105   CO2 mmol/L 25   < > 25   BUN mg/dL 10   < > 13   CREATININE mg/dL 1 08   < > 1 35*   ANION GAP mmol/L 6   < > 9   CALCIUM mg/dL 9 1   < > 8 1*   ALBUMIN g/dL  --   --  3 0*   TOTAL BILIRUBIN mg/dL  --   --  0 80   ALK PHOS U/L  --   --  60   ALT U/L  --   --  10   AST U/L  --   --  15   GLUCOSE RANDOM mg/dL 117   < > 111    < > = values in this interval not displayed  Results from last 7 days   Lab Units 12/31/22  0105   INR  1 50*             Results from last 7 days   Lab Units 01/02/23  0519 01/01/23  0441 12/31/22  0105   LACTIC ACID mmol/L  --   --  1 4   PROCALCITONIN ng/ml 4 27* 6 10* 4 39*       Lines/Drains:  Invasive Devices     Peripheral Intravenous Line  Duration           Peripheral IV 01/03/23 Dorsal (posterior); Left Forearm 1 day                      Imaging: No pertinent imaging reviewed  Recent Cultures (last 7 days):   Results from last 7 days   Lab Units 12/31/22  1046 12/31/22  0120 12/31/22  0105   BLOOD CULTURE   --  No Growth After 4 Days  No Growth After 4 Days     URINE CULTURE  30,000-39,000 cfu/ml Pseudomonas aeruginosa*  <10,000 cfu/ml  --   --        Last 24 Hours Medication List:   Current Facility-Administered Medications   Medication Dose Route Frequency Provider Last Rate   • acetaminophen  650 mg Oral Q6H PRN Stephani Ackerman MD     • aspirin  81 mg Oral Daily Lorenzo Valverde PA-C     • carvedilol  3 125 mg Oral BID With Meals Lorenzo Valverde PA-C     • cefepime 2,000 mg Intravenous Q12H Taylor Damon MD 2,000 mg (01/04/23 8955)   • enoxaparin  40 mg Subcutaneous Daily Memo Egan PA-C     • finasteride  5 mg Oral Daily Memo Egan PA-C     • pravastatin  60 mg Oral Daily With Lula Mcneal PA-C     • tamsulosin  0 4 mg Oral Daily With Dinner Memo Egan PA-C          Today, Patient Was Seen By: Taylor Damon MD    **Please Note: This note may have been constructed using a voice recognition system  **

## 2023-01-04 NOTE — QUICK NOTE
Spoke with pt's spouse Ernestine Daniel to update regarding clinical course and plan of care  All questions and concerns addressed at this time

## 2023-01-04 NOTE — ASSESSMENT & PLAN NOTE
Lab Results   Component Value Date    EGFR 69 01/04/2023    EGFR 67 01/03/2023    EGFR 60 01/02/2023    CREATININE 1 08 01/04/2023    CREATININE 1 10 01/03/2023    CREATININE 1 21 01/02/2023     - monitor renal function, renal dose medications, maintain normotension, avoid nephrotoxic agents

## 2023-01-04 NOTE — PLAN OF CARE
Problem: MOBILITY - ADULT  Goal: Maintain or return to baseline ADL function  Description: INTERVENTIONS:  -  Assess patient's ability to carry out ADLs; assess patient's baseline for ADL function and identify physical deficits which impact ability to perform ADLs (bathing, care of mouth/teeth, toileting, grooming, dressing, etc )  - Assess/evaluate cause of self-care deficits   - Assess range of motion  - Assess patient's mobility; develop plan if impaired  - Assess patient's need for assistive devices and provide as appropriate  - Encourage maximum independence but intervene and supervise when necessary  - Involve family in performance of ADLs  - Assess for home care needs following discharge   - Consider OT consult to assist with ADL evaluation and planning for discharge  - Provide patient education as appropriate  Outcome: Progressing     Problem: INFECTION - ADULT  Goal: Absence or prevention of progression during hospitalization  Description: INTERVENTIONS:  - Assess and monitor for signs and symptoms of infection  - Monitor lab/diagnostic results  - Monitor all insertion sites, i e  indwelling lines, tubes, and drains  - Monitor endotracheal if appropriate and nasal secretions for changes in amount and color  - Callender appropriate cooling/warming therapies per order  - Administer medications as ordered  - Instruct and encourage patient and family to use good hand hygiene technique  - Identify and instruct in appropriate isolation precautions for identified infection/condition  Outcome: Progressing     Problem: GENITOURINARY - ADULT  Goal: Maintains or returns to baseline urinary function  Description: INTERVENTIONS:  - Assess urinary function  - Encourage oral fluids to ensure adequate hydration if ordered  - Administer IV fluids as ordered to ensure adequate hydration  - Administer ordered medications as needed  - Offer frequent toileting  - Follow urinary retention protocol if ordered  Outcome: Progressing     Problem: METABOLIC, FLUID AND ELECTROLYTES - ADULT  Goal: Electrolytes maintained within normal limits  Description: INTERVENTIONS:  - Monitor labs and assess patient for signs and symptoms of electrolyte imbalances  - Administer electrolyte replacement as ordered  - Monitor response to electrolyte replacements, including repeat lab results as appropriate  - Instruct patient on fluid and nutrition as appropriate  Outcome: Progressing

## 2023-01-05 VITALS
TEMPERATURE: 98.2 F | RESPIRATION RATE: 18 BRPM | HEART RATE: 35 BPM | SYSTOLIC BLOOD PRESSURE: 150 MMHG | DIASTOLIC BLOOD PRESSURE: 79 MMHG | OXYGEN SATURATION: 96 %

## 2023-01-05 LAB
BACTERIA BLD CULT: NORMAL
BACTERIA BLD CULT: NORMAL

## 2023-01-05 RX ADMIN — CEFEPIME 2000 MG: 2 INJECTION, POWDER, FOR SOLUTION INTRAVENOUS at 01:59

## 2023-01-05 RX ADMIN — CEFEPIME 2000 MG: 2 INJECTION, POWDER, FOR SOLUTION INTRAVENOUS at 11:38

## 2023-01-05 RX ADMIN — CARVEDILOL 3.12 MG: 3.12 TABLET, FILM COATED ORAL at 08:35

## 2023-01-05 RX ADMIN — FINASTERIDE 5 MG: 5 TABLET, FILM COATED ORAL at 08:35

## 2023-01-05 RX ADMIN — ASPIRIN 81 MG: 81 TABLET, COATED ORAL at 08:35

## 2023-01-05 NOTE — PLAN OF CARE
Problem: Potential for Falls  Goal: Patient will remain free of falls  Description: INTERVENTIONS:  - Educate patient/family on patient safety including physical limitations  - Instruct patient to call for assistance with activity   - Consult OT/PT to assist with strengthening/mobility   - Keep Call bell within reach  - Keep bed low and locked with side rails adjusted as appropriate  - Keep care items and personal belongings within reach  - Initiate and maintain comfort rounds  - Make Fall Risk Sign visible to staff  - Offer Toileting every 2 Hours, in advance of need  - Initiate/Maintain bed alarm  - Obtain necessary fall risk management equipment: alarms  - Apply yellow socks and bracelet for high fall risk patients  - Consider moving patient to room near nurses station  Outcome: Progressing     Problem: MOBILITY - ADULT  Goal: Maintain or return to baseline ADL function  Description: INTERVENTIONS:  -  Assess patient's ability to carry out ADLs; assess patient's baseline for ADL function and identify physical deficits which impact ability to perform ADLs (bathing, care of mouth/teeth, toileting, grooming, dressing, etc )  - Assess/evaluate cause of self-care deficits   - Assess range of motion  - Assess patient's mobility; develop plan if impaired  - Assess patient's need for assistive devices and provide as appropriate  - Encourage maximum independence but intervene and supervise when necessary  - Involve family in performance of ADLs  - Assess for home care needs following discharge   - Consider OT consult to assist with ADL evaluation and planning for discharge  - Provide patient education as appropriate  Outcome: Progressing  Goal: Maintains/Returns to pre admission functional level  Description: INTERVENTIONS:  - Perform BMAT or MOVE assessment daily    - Set and communicate daily mobility goal to care team and patient/family/caregiver     - Collaborate with rehabilitation services on mobility goals if consulted  - Perform Range of Motion 4 times a day  - Reposition patient every 4 hours    - Dangle patient 4 times a day  - Stand patient 4 times a day  - Ambulate patient 4 times a day  - Out of bed to chair 3 times a day   - Out of bed for meals 3 times a day  - Out of bed for toileting  - Record patient progress and toleration of activity level   Outcome: Progressing     Problem: INFECTION - ADULT  Goal: Absence or prevention of progression during hospitalization  Description: INTERVENTIONS:  - Assess and monitor for signs and symptoms of infection  - Monitor lab/diagnostic results  - Monitor all insertion sites, i e  indwelling lines, tubes, and drains  - Monitor endotracheal if appropriate and nasal secretions for changes in amount and color  - S[place]ent appropriate cooling/warming therapies per order  - Administer medications as ordered  - Instruct and encourage patient and family to use good hand hygiene technique  - Identify and instruct in appropriate isolation precautions for identified infection/condition  Outcome: Progressing     Problem: GENITOURINARY - ADULT  Goal: Maintains or returns to baseline urinary function  Description: INTERVENTIONS:  - Assess urinary function  - Encourage oral fluids to ensure adequate hydration if ordered  - Administer IV fluids as ordered to ensure adequate hydration  - Administer ordered medications as needed  - Offer frequent toileting  - Follow urinary retention protocol if ordered  Outcome: Progressing     Problem: METABOLIC, FLUID AND ELECTROLYTES - ADULT  Goal: Electrolytes maintained within normal limits  Description: INTERVENTIONS:  - Monitor labs and assess patient for signs and symptoms of electrolyte imbalances  - Administer electrolyte replacement as ordered  - Monitor response to electrolyte replacements, including repeat lab results as appropriate  - Instruct patient on fluid and nutrition as appropriate  Outcome: Progressing

## 2023-01-05 NOTE — DISCHARGE INSTR - AVS FIRST PAGE
Dear Hipolito Kumar,     It was our pleasure to care for you here at [facility]  It is our hope that we were always able to exceed the expected standards for your care during your stay  You were hospitalized due to sepsis caused by a urinary tract infection  You were cared for on the [facility] 4th floor by Jones Kelly MD under the service of Yony Pickett,  with the [facility] who covers for your primary care physician (PCP), Ness Moran MD, while you were hospitalized  If you have any questions or concerns related to this hospitalization, you may contact us at [phone]  For follow up as well as any medication refills, we recommend that you follow up with your primary care physician  A registered nurse will reach out to you by phone within a few days after your discharge to answer any additional questions that you may have after going home  However, at this time we provide for you here, the most important instructions / recommendations at discharge:     Notable Medication Adjustments -   Please start taking finasteride (Proscar) 1 tablet (5 mg) daily  Please do not take the previously prescribed antibiotic cephalexin (Keflex)  Testing Required after Discharge -   None  Important follow up information -   Please keep your scheduled appointment with Urology on 1/6/23  Please schedule an appointment with your primary care provider within one week of discharge from the hospital  Other Instructions -   Please continue to perform straight catheterization in the morning and at bedtime  Please review this entire after visit summary as additional general instructions including medication list, appointments, activity, diet, any pertinent wound care, and other additional recommendations from your care team that may be provided for you        Sincerely,     Jones Kelly MD

## 2023-01-05 NOTE — DISCHARGE SUMMARY
[facility]  Discharge- Jose J Murcia 1950, 81 y o  male MRN: [id number]  Unit/Bed#: S -01 Encounter: [id number]  Primary Care Provider: Federico Fowler MD   Date and time admitted to hospital: 12/31/2022 12:17 AM    * Sepsis due to Pseudomonas UTI [facility])  Assessment & Plan  Presents with urinary retention w known BPH, worsening pain, fever  Recent rodriguez placement 12/19  ED removed rodriguez night of admission   - VS: febrile, normotensive  - leukocytosis w/ neutrophil shift, lactate wnl, procal elevated (peak 6 1)  - Ucx growing 30-39k cfu Pseudomonas with good susceptibility to cefepime  -Bloodcx NG after 5 days  - CTabd: thick walled urinary bladder consistent with cystitis, air within lumen secondary to catheterization vs gas forming infection, large bladder diverticulum causing urinary stasis/difficulty voiding  - ABX: failed outpatient cefpodoxime/cephalexin  received zosyn from 12/30 to 1/2 w clinical improvement, changed to cefepime following urine culture and susceptibility results    Pt clinically improved on IV abx, sepsis resolved  Afebrile  WBC wnl  Plan:  -Urology on board, appreciate recommendations  -Started cefepime 2g q12h (renal dosing) for better susceptibility profile on 1/2/23  Per pharm, 3 days IV beta-lactam is adequate treatment for complicated UTI  Last dose afternoon of 1/5/23   -Cannot transition to oral agent, given pt's allergy to levofloxacin ("bad hives")  -Rodriguez recommended per Urology to prevent urinary stasis in bladder diverticulum, however pt declined, would prefer intermittent straight cathing   Pt instructed in self cath BID and demonstrating good technique    -Provide supplies for intermittent straight cathing BID at discharge  -f/u in Uro clinic within 1 week of discharge for further cath supplies and instruction  -Urology to schedule for o/p cystoscopy and transrectal US to guide subsequent operative management, anticipating he will require diverticulectomy and cystorrhaphy  Acute urinary retention  Assessment & Plan  Urinary retention with known BPH  Hobbs removed by ED on presentation for voiding trial  This admission, CT A/P shows thick-walled urinary bladder consistent with cystitis and large bladder diverticulum    urology on board, recommendations appreciated  Continue urinary retention protocol with serial PVRs  Restart patient's Flomax and Proscar   As above, pt declining Hobbs; pt instructed in intermittent self-cath BID and performing well  Will need f/u with Uro o/p as above    Bladder diverticulum  Assessment & Plan  Found on CT A/P    Plan as above under sepsis d/t UTI & urinary retention      Medical Problems     Resolved Problems  Date Reviewed: 12/31/2022          Resolved    Hyperchloremia 1/4/2023     Resolved by  Sebastien Fernandes MD        Discharging Resident: Sebastien Fernandes MD  Discharging Attending: Lonny Hudson DO  PCP: Arik Borja MD  Admission Date:   Admission Orders (From admission, onward)     Ordered        12/31/22 0329  INPATIENT ADMISSION  Once                      Discharge Date: 01/05/23    Consultations During Hospital Stay:   · Urology    Procedures Performed:   · Intermittent straight cathing    Significant Findings / Test Results:   Febrile  Leukocytosis with left shift  elevated procal  Urine Cx growing Pseudomonas 30-39k cfu  Elevated creatinine  CT chest abdomen pelvis w contrast 12/31/2022: 1  Thick-walled urinary bladder consistent with cystitis  Air within the lumen may be achievable to recent catheterization or gas forming infection  2   Large bladder diverticulum  Incidental Findings:   · None     Test Results Pending at Discharge (will require follow up):   · None     Outpatient Tests Requested:  · None    Complications:  None    Reason for Admission: Urosepsis    Hospital Course:   Magdalena Dc is a 81 y o  male patient who originally presented to the hospital on 12/31/2022 due to abdominal pain and urinary retention, found to meet sepsis criteria with UTI suspected source  Started empirically on Zosyn  CT demonstrating bladder diverticulum  Urology consulted and recommending Hobbs and o/p f/u for imaging and surgical correction  pt declined Hobbs, however amenable to self-catheterization, which he was taught to perform during this admission with good technique  UCx positive for Pseudomonas  With fluoroquinolone allergy, unable to transition to oral abx, so pt remained admitted for administration of IV abx, switched to cefepime following susceptibilities  Pt much improved and stable for d/c to home with Uro f/u  Please see above list of diagnoses and related plan for additional information  Condition at Discharge: good    Discharge Day Visit / Exam:   Subjective: This morning, Mr Leeanne Moser is seen sitting up in bed, alert, engaged, in no acute distress  He reports feeling well this AM, has no complaints, denies sxs  Brief ROS negative  Feels very ready for discharge to home  Vitals: Blood Pressure: 150/79 (01/05/23 0754)  Pulse: (!) 35 (01/05/23 0754)  Temperature: 98 2 °F (36 8 °C) (01/04/23 0700)  Temp Source: Oral (01/04/23 0700)  Respirations: 18 (01/04/23 2224)  SpO2: 96 % (01/05/23 0754)  Exam:   Physical Exam  Vitals and nursing note reviewed  Constitutional:       General: He is not in acute distress  Appearance: Normal appearance  He is obese  He is not ill-appearing, toxic-appearing or diaphoretic  HENT:      Head: Normocephalic and atraumatic  Eyes:      General: No scleral icterus  Right eye: No discharge  Left eye: No discharge  Conjunctiva/sclera: Conjunctivae normal    Cardiovascular:      Rate and Rhythm: Normal rate  Rhythm irregular  Pulses: Normal pulses  Heart sounds: Normal heart sounds  No murmur heard  No friction rub  No gallop  Pulmonary:      Effort: Pulmonary effort is normal  No respiratory distress        Breath sounds: Normal breath sounds  No stridor  No wheezing, rhonchi or rales  Chest:      Chest wall: No tenderness  Abdominal:      General: Bowel sounds are normal  There is no distension  Palpations: Abdomen is soft  Tenderness: There is no abdominal tenderness  There is no guarding or rebound  Musculoskeletal:         General: No tenderness  Right lower leg: Edema (trace to 1+ dorsum of foot to low ankle) present  Left lower leg: Edema (trace to 1+ dorsum of foot to low ankle) present  Skin:     General: Skin is warm and dry  Coloration: Skin is not jaundiced or pale  Neurological:      Mental Status: He is alert and oriented to person, place, and time  Psychiatric:         Mood and Affect: Mood normal          Behavior: Behavior normal         Discussion with Family: Patient declined call to   Discharge instructions/Information to patient and family:   See after visit summary for information provided to patient and family  Provisions for Follow-Up Care:  See after visit summary for information related to follow-up care and any pertinent home health orders  Disposition:   Home    Planned Readmission: None    Discharge Medications:  See after visit summary for reconciled discharge medications provided to patient and/or family        **Please Note: This note may have been constructed using a voice recognition system**

## 2023-01-05 NOTE — CASE MANAGEMENT
Case Management Discharge Planning Note    Patient name Damián Vee  Location S /S -71 MRN [id number]  : 1950 Date 2023       Current Admission Date: 2022  Current Admission Diagnosis:Sepsis due to Pseudomonas UTI [facility])   Patient Active Problem List    Diagnosis Date Noted   • Bladder diverticulum 2023   • Sepsis due to Pseudomonas UTI ([facility] [address] ) 2022   • Stage 3a chronic kidney disease ([facility] [address] ) 2022   • HTN (hypertension) 2022   • Coronary artery disease involving native coronary artery of native heart without angina pectoris 2022   • Acute urinary retention 2022   • BPH (benign prostatic hyperplasia) 2022   • HLD (hyperlipidemia) 2022      LOS (days): 5  Geometric Mean LOS (GMLOS) (days): 3 50  Days to GMLOS:-1 9     OBJECTIVE:  Risk of Unplanned Readmission Score: 10 05         Current admission status: Inpatient   Preferred Pharmacy:   UNKNOWN - FOLLOW UP PRIOR TO DISCHARGE TO E-PRESCRIBE  No address on file      [id number] [place], [place] - [address] [place] [facility] [address]  Phone: [phone] Fax: [phone]    Primary Care Provider: Luisana England MD    Primary Insurance: [facility]  Secondary Insurance:     DISCHARGE DETAILS:    Discharge planning discussed with[de-identified] Patient  Freedom of Choice: Yes  Comments - Freedom of Choice: Reviewed supplies needed at Dc  CM contacted family/caregiver?: No- see comments  Were Treatment Team discharge recommendations reviewed with patient/caregiver?: Yes  Did patient/caregiver verbalize understanding of patient care needs?: Yes  Were patient/caregiver advised of the risks associated with not following Treatment Team discharge recommendations?: Yes    Contacts  Patient Contacts: Patient  Relationship to Patient[de-identified] Other (Comment)  Contact Method:  In Person  Reason/Outcome: Discharge Planning, Referral    Requested  [facility] [address]         Is the patient interested in [place] [address] at discharge?: No    DME Referral Provided  Referral made for DME?: Yes  DME referral completed for the following items[de-identified] Other (Curved Tip Ctheter and insertion supplies)  DME Supplier Name[de-identified] [employer] (Through [address] - faxed order form)    Other Referral/Resources/Interventions Provided:  Interventions: DME  Referral Comments: Patient plans to continue to straight cath himself at home  A supply order form was completed and faxed to [employer] an [address] [place] [address] at [phone]  A copy of this order form was also placed in medical records for scanning  Primary nurse will provide patient with a couple days of supplies to hold over until the order is processed  Additionally, patient has a follow up appointment with the urologist tomorrow  Treatment Team Recommendation: Home  Discharge Destination Plan[de-identified] Home  Transport at Discharge : Family                             IMM Given (Date):: 01/05/23  IMM Given to[de-identified] Patient        IMM reviewed with patient  Patient agrees with discharge determination  '    CM coordinated with urology team to complete [employer] order form for catheter supplies which was then faxed to them at [phone]  A copy of this form was placed in medical records for scanning  Patient will be sent home with a few days of supplies until his order is able to be processed but is also following up with urology tomorrow  Family is at bedside and will provide transportation home  CM encouraged patient to follow up with all recommended appointments after discharge  Patient advised of importance for patient and family to participate in managing patient's medical well being

## 2023-01-05 NOTE — ASSESSMENT & PLAN NOTE
Presents with urinary retention w known BPH, worsening pain, fever  Recent rodriguez placement 12/19  ED removed rodriguez night of admission   - VS: febrile, normotensive  - leukocytosis w/ neutrophil shift, lactate wnl, procal elevated (peak 6 1)  - Ucx growing 30-39k cfu Pseudomonas with good susceptibility to cefepime  -Bloodcx NG after 5 days  - CTabd: thick walled urinary bladder consistent with cystitis, air within lumen secondary to catheterization vs gas forming infection, large bladder diverticulum causing urinary stasis/difficulty voiding  - ABX: failed outpatient cefpodoxime/cephalexin  received zosyn from 12/30 to 1/2 w clinical improvement, changed to cefepime following urine culture and susceptibility results    Pt clinically improved on IV abx, sepsis resolved  Afebrile  WBC wnl  Plan:  -Urology on board, appreciate recommendations  -Started cefepime 2g q12h (renal dosing) for better susceptibility profile on 1/2/23  Per pharm, 3 days IV beta-lactam is adequate treatment for complicated UTI  Last dose afternoon of 1/5/23   -Cannot transition to oral agent, given pt's allergy to levofloxacin ("bad hives")  -Rodriguez recommended per Urology to prevent urinary stasis in bladder diverticulum, however pt declined, would prefer intermittent straight cathing  Pt instructed in self cath BID and demonstrating good technique    -Provide supplies for intermittent straight cathing BID at discharge  -f/u in Uro clinic within 1 week of discharge for further cath supplies and instruction  -Urology to schedule for o/p cystoscopy and transrectal US to guide subsequent operative management, anticipating he will require diverticulectomy and cystorrhaphy

## 2023-01-06 ENCOUNTER — PROCEDURE VISIT (OUTPATIENT)
Dept: UROLOGY | Facility: CLINIC | Age: 73
End: 2023-01-06

## 2023-01-06 ENCOUNTER — TELEPHONE (OUTPATIENT)
Dept: CASE MANAGEMENT | Facility: HOSPITAL | Age: 73
End: 2023-01-06

## 2023-01-06 DIAGNOSIS — R33.8 ACUTE URINARY RETENTION: Primary | ICD-10-CM

## 2023-01-06 NOTE — PROGRESS NOTES
Patient should perform clean intermittent catheterization 2-3x/day, requires coude catheter due to prostatomegaly

## 2023-01-06 NOTE — TELEPHONE ENCOUNTER
CM received a call from [email or web address] [facility] and was told that they do not participate with patient's insurance to get the supplies ordered  CM communicated with the urology team PA who will send a message to get the supplies ordered from the outpatient office for the patient

## 2023-01-06 NOTE — UTILIZATION REVIEW
NOTIFICATION OF ADMISSION DISCHARGE   This is a Notification of Discharge from [facility]  Please be advised that this patient has been discharge from our facility  Below you will find the admission and discharge date and time including the patient’s disposition  UTILIZATION REVIEW CONTACT:  Oscar Santiago MA  Utilization   Network Utilization Review Department  Phone: [phone] x carefully listen to the prompts  All voicemails are confidential   Email: [email or web address]     ADMISSION INFORMATION  PRESENTATION DATE: 12/31/2022 12:17 AM  OBERVATION ADMISSION DATE:   INPATIENT ADMISSION DATE: 12/31/22  3:29 AM   DISCHARGE DATE: 1/5/2023  2:00 PM   DISPOSITION:Home/Self Care    IMPORTANT INFORMATION:  Send all requests for admission clinical reviews, approved or denied determinations and any other requests to dedicated fax number below belonging to the campus where the patient is receiving treatment   List of dedicated fax numbers:  [address] DENIALS (Administrative/Medical Necessity) [phone]   [address] (Maternity/NICU/Pediatrics) [phone]   [facility] [phone]   [facility] 87 [phone]   [address] [phone]   [address] [facility] [phone] 90 [facility] [phone]   [address] [place], [place] [address] [phone]   [facility]  [phone]   [address] [facility] [phone]   [facility] [address] [facility] [phone]

## 2023-01-06 NOTE — PROGRESS NOTES
1/6/2023    Leanne Nguyen is a 81 y o  male  [id number]    Diagnosis:  Chief Complaint    Urinary Retention         Patient presents for assess his ability to Clean intermittent catheterization managed by Dr Malachi Walker  Patient was taught how to catheterize self yesterday while inpatient in hospital     Was told to catheterize self 2x/day ad urinating inbetween  Patient recorded a list of outputs from leaving yesterday to today  Scanned amounts can be found under media tab  Patient reports he has been successful in catheterizing self  Only difficulties getting around prostate  I explained to him with the coude and he thinks that may be more helpful  Additionally he told me he forgot to ask for lubricant and had none available at home so he has been catheterizing without it  Provided patient with a tube of lubricant  Explained plan for a cysto trus this coming week  Explained procedures and what we will use this information for  Plan:  · Patient provided with catheter samples and a copy of written instructions  · Patient will catheterize 2-3 x daily or every 10 hours  · Catheter ordered submitted today to [place] [id number] for size 16F coude  · Patient will Follow up Next week on 1/11/23 with Dr Malachi Walker  · Patient knows to call the office with any concerns, problems with supplies or difficulty passing catheter  There were no vitals filed for this visit  Teaching:  Patient had just catherized self for 500 ml+  Prior to coming into office  Did not feel like he needed to preform catheterization in front of me  Went over hand cleaning and hygiene prior to catheterizing   Patient reports no questions     Jean Bingham RN

## 2023-01-09 ENCOUNTER — HOSPITAL ENCOUNTER (EMERGENCY)
Facility: HOSPITAL | Age: 73
Discharge: HOME | End: 2023-01-09
Attending: EMERGENCY MEDICINE

## 2023-01-09 ENCOUNTER — VBI (OUTPATIENT)
Dept: ADMINISTRATIVE | Facility: OTHER | Age: 73
End: 2023-01-09

## 2023-01-09 VITALS
SYSTOLIC BLOOD PRESSURE: 179 MMHG | RESPIRATION RATE: 16 BRPM | TEMPERATURE: 98.3 F | DIASTOLIC BLOOD PRESSURE: 101 MMHG | WEIGHT: 187 LBS | BODY MASS INDEX: 30.18 KG/M2 | OXYGEN SATURATION: 97 % | HEART RATE: 65 BPM
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
## 2023-01-09 NOTE — TELEPHONE ENCOUNTER
Pt called and left  stated he receieved a call from urological [facility] and pt is not sure what he is suppose to do next   Pt did not leave any other info on     Pt call [phone]

## 2023-01-10 ENCOUNTER — TELEPHONE (OUTPATIENT)
Dept: OTHER | Facility: OTHER | Age: 73
End: 2023-01-10

## 2023-01-10 ENCOUNTER — TELEPHONE (OUTPATIENT)
Dept: UROLOGY | Facility: CLINIC | Age: 73
End: 2023-01-10

## 2023-01-10 PROBLEM — Z71.2 PERSON CONSULTING FOR EXPLANATION OF EXAMINATION OR TEST FINDING: Status: ACTIVE | Noted: 2023-01-10

## 2023-01-10 NOTE — DISCHARGE INSTRUCTIONS
- Call your primary care provider to schedule an appointment for further evaluation and treatment if your back pain continues for the next several days  - return to the emergency department if you develop sudden onset weakness or numbness in any part of your body, confusion, difficulty walking or difficulty talking

## 2023-01-10 NOTE — TELEPHONE ENCOUNTER
I called patient and left a message regarding his appointment scheduled for tomorrow with Dr Kathleen Chaparro  This appointment is now moved to 3pm no longer 2pm      I asked that he call back to confirm he is able to make this appointment at 3pm and to still arrive 15 mins prior to register

## 2023-01-10 NOTE — TELEPHONE ENCOUNTER
I spoke with patient and let him know [facility] calls to verify his information and get the approval for delivery  Pt will call them back to set this up

## 2023-01-10 NOTE — TELEPHONE ENCOUNTER
Johanna from [employer]  She is working on an order for 16 [place] cath  The patient uses 14 [place] caths and prefers them  She needs a new order for 14 [place] cath              Fax [phone]
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
## 2023-01-11 ENCOUNTER — PROCEDURE VISIT (OUTPATIENT)
Dept: UROLOGY | Facility: CLINIC | Age: 73
End: 2023-01-11

## 2023-01-11 VITALS
HEIGHT: 66 IN | HEART RATE: 59 BPM | OXYGEN SATURATION: 96 % | BODY MASS INDEX: 30.15 KG/M2 | DIASTOLIC BLOOD PRESSURE: 80 MMHG | SYSTOLIC BLOOD PRESSURE: 150 MMHG | WEIGHT: 187.6 LBS

## 2023-01-11 DIAGNOSIS — N18.31 STAGE 3A CHRONIC KIDNEY DISEASE (HCC): ICD-10-CM

## 2023-01-11 DIAGNOSIS — N40.1 BPH WITH OBSTRUCTION/LOWER URINARY TRACT SYMPTOMS: ICD-10-CM

## 2023-01-11 DIAGNOSIS — Z71.2 PERSON CONSULTING FOR EXPLANATION OF EXAMINATION OR TEST FINDING: ICD-10-CM

## 2023-01-11 DIAGNOSIS — N13.8 BPH WITH OBSTRUCTION/LOWER URINARY TRACT SYMPTOMS: ICD-10-CM

## 2023-01-11 DIAGNOSIS — R33.8 ACUTE URINARY RETENTION: Primary | ICD-10-CM

## 2023-01-11 DIAGNOSIS — N32.3 BLADDER DIVERTICULUM: ICD-10-CM

## 2023-01-11 RX ORDER — ACETAMINOPHEN 325 MG/1
975 TABLET ORAL ONCE
Status: CANCELLED | OUTPATIENT
Start: 2023-01-11 | End: 2023-01-11

## 2023-01-11 NOTE — PROGRESS NOTES
Problem List Items Addressed This Visit        Genitourinary    Stage 3a chronic kidney disease ([facility] [address] )    Acute urinary retention - Primary    Relevant Orders    Case request operating room: TRANSURETHRAL RESECTION OF PROSTATE (TURP) (Completed)    Bladder diverticulum       Other    Person consulting for explanation of examination or test finding   Other Visit Diagnoses     BPH with obstruction/lower urinary tract symptoms        Relevant Orders    PSA Total, Diagnostic    Case request operating room: TRANSURETHRAL RESECTION OF PROSTATE (TURP) (Completed)            Discussion:      Jina Melendez and his female  and I had a productive consultation today  He has been having recurrent urinary tract infections, trouble urinating, and is seen to have a bladder diverticulum along with acute urinary retention  He has been performing clean intermittent catheterization obtaining volumes between 500 mL and 800 mL  His cystoscopic work-up today shows a highly obstructive prostate which measures 22 g  He is seen to have some nipple valve effect of the bladder neck  He did inquire as to UroLift but given his anatomy I do not believe this to be the best option for him  He does have a history of some slightly elevated PSA, likely due to his urinary retention  I have requested repeat testing  If this is wildly elevated I will consider a prostate biopsy to rule out clinically significant prostate cancer as indicated based on his PSA value  Should he have clinically significant prostate cancer consideration can be given to a concomitant bladder diverticulectomy as well as a robot-assisted laparoscopic radical prostatectomy however bladder diverticulectomy is often quite difficult due to inflammation about the bladder diverticulum      Given his current findings and his wish to eventually be off of intermittent catheterization I have recommended a staged approach with transurethral resection of prostate to be followed by potential diverticulectomy in the future should he have ongoing issues after fixing his bladder outlet  He does have a history of coronary artery disease status post a coronary artery bypass grafting procedure many years ago, he did undergo coronary stenting following that and does take some cardiac medications for preventative purposes  He will require medical clearance in preparation for surgery  Otherwise denies easy bruising and bleeding  Good performance status at this time  No recent heart attack or stroke  I discussed with him the pre-, pily-, postoperative care for transurethral resection of prostate including the risks of infection, bleeding, pain, damage to surrounding structures, need for additional procedures, risk of failure of the procedure, risk of anesthesia, risk of positioning complications including neurapraxia, chronic pain, and paralysis as well as risk of rhabdomyolysis and compartment syndrome  Risk of deep venous thrombosis and venous thromboembolism as well as pneumonia and other potential unpredictable complications were also discussed with the patient  He is aware that he will have some changes in sexual ejaculatory function potentially after surgery as well as the risk of bladder neck contracture or urethral stricture  He will continue his clean intermittent catheterization in the meantime  All questions and concerns answered and addressed  He has participated in the shared/informed decision making model regarding urologic surgery with risk and wishes to proceed            Assessment and plan:       Please see problem oriented charting for the assessment plan of today's urological complaints    Ric Hill MD      Chief Complaint     Chief Complaint   Patient presents with   • Cystoscopy         History of Present Illness     Florina Samson is a 81 y o  man with urinary tract infection and urinary retention and bladder thickening  Extensive discussion      The following portions of the patient's history were reviewed and updated as appropriate: allergies, current medications, past family history, past medical history, past social history, past surgical history and problem list     Detailed Urologic History     - please refer to HPI    Review of Systems     Review of Systems   Constitutional: Negative  HENT: Negative  Eyes: Negative  Respiratory: Negative  Cardiovascular: Negative  Gastrointestinal: Negative  Endocrine: Negative  Genitourinary: Positive for difficulty urinating  Musculoskeletal: Negative  Skin: Negative  Allergic/Immunologic: Negative  Neurological: Negative  Hematological: Negative  Psychiatric/Behavioral: Negative  Allergies     Allergies   Allergen Reactions   • Dulcolax [Bisacodyl] Hives   • Latex    • Levofloxacin Hives       Physical Exam     Physical Exam  Vitals reviewed  Constitutional:       General: He is not in acute distress  Appearance: Normal appearance  He is not ill-appearing, toxic-appearing or diaphoretic  HENT:      Head: Normocephalic and atraumatic  Eyes:      General: No scleral icterus  Right eye: No discharge  Left eye: No discharge  Cardiovascular:      Pulses: Normal pulses  Pulmonary:      Effort: Pulmonary effort is normal    Abdominal:      Palpations: There is no mass  Genitourinary:     Penis: Normal     Musculoskeletal:         General: No swelling  Skin:     Coloration: Skin is not jaundiced  Neurological:      General: No focal deficit present  Mental Status: He is alert  Cranial Nerves: No cranial nerve deficit  Psychiatric:         Mood and Affect: Mood normal          Behavior: Behavior normal          Thought Content:  Thought content normal          Judgment: Judgment normal              Vital Signs  Vitals:    01/11/23 1458   BP: 150/80   BP Location: Left arm   Patient Position: Sitting   Cuff Size: Standard   Pulse: 59 SpO2: 96%   Weight: 85 1 kg (187 lb 9 6 oz)   Height: 5' 6" (1 676 m)         Current Medications       Current Outpatient Medications:   •  alfuzosin (UROXATRAL) 10 mg 24 hr tablet, Take 10 mg by mouth daily Take 1 tab by mouth daily after same meal each day, Disp: , Rfl:   •  aspirin (ECOTRIN LOW STRENGTH) 81 mg EC tablet, Take 81 mg by mouth daily, Disp: , Rfl:   •  carvedilol (COREG) 6 25 mg tablet, 3 125 mg, Disp: , Rfl:   •  Catheters (Magic3 Go Intermittent Cath) MISC, Insert into the urethra 2 (two) times a day 14 or 16 [facility] as available, Disp: 30 each, Rfl: 2  •  finasteride (PROSCAR) 5 mg tablet, Take 1 tablet (5 mg total) by mouth daily, Disp: 90 tablet, Rfl: 0  •  niacin (SLO-NIACIN) 500 mg tablet, 1,000 mg, Disp: , Rfl:   •  pravastatin (PRAVACHOL) 40 mg tablet, 60 mg, Disp: , Rfl:       Active Problems     Patient Active Problem List   Diagnosis   • Stage 3a chronic kidney disease ([facility] [address] )   • HTN (hypertension)   • Coronary artery disease involving native coronary artery of native heart without angina pectoris   • Acute urinary retention   • BPH (benign prostatic hyperplasia)   • HLD (hyperlipidemia)   • Bladder diverticulum   • Person consulting for explanation of examination or test finding         Past Medical History     Past Medical History:   Diagnosis Date   • BPH (benign prostatic hyperplasia)    • Hx of CABG    • Hypercholesterolemia    • Hypertension          Surgical History     Past Surgical History:   Procedure Laterality Date   • CORONARY ARTERY BYPASS GRAFT           Family History     History reviewed  No pertinent family history        Social History     Social History     Social History     Tobacco Use   Smoking Status Former   • Types: Cigarettes   Smokeless Tobacco Never         Pertinent Lab Values     Lab Results   Component Value Date    CREATININE 1 08 01/04/2023       No results found for: PSA          Pertinent Imaging      realtime review of TRUS

## 2023-01-11 NOTE — PROGRESS NOTES
Office Cystoscopy Procedure Note    Indication:     urinary retention     Informed consent   The risks, benefits, complications, treatment options, and expected outcomes were discussed with the patient  The patient concurred with the proposed plan and provided informed consent  Anesthesia  Lidocaine jelly 2%    Antibiotic prophylaxis   None    Procedure  The patient was placed in the supineposition, was prepped and draped in the usual manner using sterile technique, and 2% lidocaine jelly instilled into the urethra  A 17 F flexible cystoscope was then inserted into the urethra and the urethra and bladder carefully examined  The following findings were noted:    Findings:  Urethra:  bryan  Prostate:  Trilobar hypertrophy, highly obstructed  Bladder:  Bladder diverticulum, os at the right bladder dome, normal mucosa inside of the tic  Ureteral orifices:  normal  Other findings:  Nipple valve prostate on urinary retention    Specimens: None                 Complications:    None; patient tolerated the procedure well           Disposition: To home after 30 minute observation  Condition: Stable    Plan: High grade obstruction and bladder diverticulum noted, no stones, no tumors  Cystoscopy     Date/Time 1/11/2023 3:43 PM     Performed by  Dre Croft MD     Authorized by Dre Croft MD      Universal Protocol:  Consent: Verbal consent obtained  Written consent obtained    Risks and benefits: risks, benefits and alternatives were discussed  Consent given by: patient  Patient understanding: patient states understanding of the procedure being performed  Patient consent: the patient's understanding of the procedure matches consent given  Procedure consent: procedure consent matches procedure scheduled  Relevant documents: relevant documents present and verified  Test results: test results available and properly labeled  Site marked: the operative site was not marked  Radiology Images displayed and confirmed  If images not available, report reviewed: imaging studies available  Required items: required blood products, implants, devices, and special equipment available  Patient identity confirmed: verbally with patient and provided demographic data        Procedure Details:  Procedure type: cystoscopy    Patient tolerance: Patient tolerated the procedure well with no immediate complications    Additional Procedure Details: Office Cystoscopy Procedure Note    Indication:     urinary retention     Informed consent   The risks, benefits, complications, treatment options, and expected outcomes were discussed with the patient  The patient concurred with the proposed plan and provided informed consent  Anesthesia  Lidocaine jelly 2%    Antibiotic prophylaxis   None    Procedure  The patient was placed in the supineposition, was prepped and draped in the usual manner using sterile technique, and 2% lidocaine jelly instilled into the urethra  A 17 F flexible cystoscope was then inserted into the urethra and the urethra and bladder carefully examined  The following findings were noted:    Findings:  Urethra:  bryan  Prostate:  Trilobar hypertrophy, highly obstructed  Bladder:  Bladder diverticulum, os at the right bladder dome, normal mucosa inside of the tic  Ureteral orifices:  normal  Other findings:  Nipple valve prostate on urinary retention    Specimens: None                 Complications:    None; patient tolerated the procedure well           Disposition: To home after 30 minute observation  Condition: Stable    Plan: High grade obstruction and bladder diverticulum noted, no stones, no tumors

## 2023-01-11 NOTE — LETTER
January 11, 2023     [place]le [place], [address]    Patient: Hanane Regalado   YOB: 1950   Date of Visit: 1/11/2023       Dear Dr Jennifer Hunt Recipients: Thank you for referring Hanane Regalado to me for evaluation  Below are my notes for this consultation  If you have questions, please do not hesitate to call me  I look forward to following your patient along with you  Sincerely,        Robin Castaneda MD        CC: No Recipients  Robin Castaneda MD  1/11/2023  3:46 PM  Sign when Signing Visit  Office TRUS     Indication    urinary retention    Informed consent   The risks, benefits and alternatives to TRUS discussed with patient, consent obtained      Transrectal ultrasonography  The patient was placed in the left lateral decubitus position  After an attentive digital rectal examination, a 7 5 mHz sidefire ultrasound probe was gently inserted into the rectum and biplanar imaging of the prostate was done with the findings noted below  Images were taken of any abnormal findings and also to document prostate size  Bladder  The bladder base appeared normal     Prostate    Ultrasound size measurements:  -Volume:  22 44 cm3    Ultrasound findings:  -Cysts: None  -Masses: None  -Median lobe: present                    Complications  There were no procedural complications  Disposition  The patient was dismissed to home     Post-procedure instructions: Today he underwent an uncomplicated transrectal ultrasound showing a nipple valve prostate and a 22 44 gram gland          Biopsy prostate     Date/Time 1/11/2023 3:45 PM     Performed by  Robin Castaneda MD     Authorized by Robin Castaneda MD      Universal Protocol   Consent: Verbal consent obtained  Written consent obtained    Risks and benefits: risks, benefits and alternatives were discussed  Consent given by: patient  Patient understanding: patient states understanding of the procedure being performed  Patient consent: the patient's understanding of the procedure matches consent given  Procedure consent: procedure consent matches procedure scheduled  Relevant documents: relevant documents present and verified  Test results: test results available and properly labeled  Site marked: the operative site was not marked  Radiology Images displayed and confirmed  If images not available, report reviewed: imaging studies available  Required items: required blood products, implants, devices, and special equipment available  Patient identity confirmed: verbally with patient and provided demographic data        Local anesthesia used: no     Anesthesia   Local anesthesia used: no     Sedation   Patient sedated: no        Specimen: no    Culture: no   Procedure Details   Procedure Notes: Office TRUS     Indication    urinary retention    Informed consent   The risks, benefits and alternatives to TRUS discussed with patient, consent obtained      Transrectal ultrasonography  The patient was placed in the left lateral decubitus position  After an attentive digital rectal examination, a 7 5 mHz sidefire ultrasound probe was gently inserted into the rectum and biplanar imaging of the prostate was done with the findings noted below  Images were taken of any abnormal findings and also to document prostate size  Bladder  The bladder base appeared normal     Prostate    Ultrasound size measurements:  -Volume:  22 44 cm3    Ultrasound findings:  -Cysts: None  -Masses: None  -Median lobe: present                    Complications  There were no procedural complications  Disposition  The patient was dismissed to home     Post-procedure instructions:      Today he underwent an uncomplicated transrectal ultrasound showing a nipple valve prostate and a 22 44 gram gland  Patient Transportation: confirmed  Patient tolerance: patient tolerated the procedure well with no immediate complications                       Yudelka Crawford MD  1/11/2023 3:44 PM  Sign when Signing Visit  Office Cystoscopy Procedure Note    Indication:     urinary retention     Informed consent   The risks, benefits, complications, treatment options, and expected outcomes were discussed with the patient  The patient concurred with the proposed plan and provided informed consent  Anesthesia  Lidocaine jelly 2%    Antibiotic prophylaxis   None    Procedure  The patient was placed in the supineposition, was prepped and draped in the usual manner using sterile technique, and 2% lidocaine jelly instilled into the urethra  A 17 F flexible cystoscope was then inserted into the urethra and the urethra and bladder carefully examined  The following findings were noted:    Findings:  Urethra:  bryan  Prostate:  Trilobar hypertrophy, highly obstructed  Bladder:  Bladder diverticulum, os at the right bladder dome, normal mucosa inside of the tic  Ureteral orifices:  normal  Other findings:  Nipple valve prostate on urinary retention    Specimens: None                 Complications:    None; patient tolerated the procedure well           Disposition: To home after 30 minute observation  Condition: Stable    Plan: High grade obstruction and bladder diverticulum noted, no stones, no tumors  Cystoscopy     Date/Time 1/11/2023 3:43 PM     Performed by  Darleen Acuna MD     Authorized by Darleen Acuna MD      Universal Protocol:  Consent: Verbal consent obtained  Written consent obtained    Risks and benefits: risks, benefits and alternatives were discussed  Consent given by: patient  Patient understanding: patient states understanding of the procedure being performed  Patient consent: the patient's understanding of the procedure matches consent given  Procedure consent: procedure consent matches procedure scheduled  Relevant documents: relevant documents present and verified  Test results: test results available and properly labeled  Site marked: the operative site was not marked  Radiology Images displayed and confirmed  If images not available, report reviewed: imaging studies available  Required items: required blood products, implants, devices, and special equipment available  Patient identity confirmed: verbally with patient and provided demographic data        Procedure Details:  Procedure type: cystoscopy    Patient tolerance: Patient tolerated the procedure well with no immediate complications    Additional Procedure Details: Office Cystoscopy Procedure Note    Indication:     urinary retention     Informed consent   The risks, benefits, complications, treatment options, and expected outcomes were discussed with the patient  The patient concurred with the proposed plan and provided informed consent  Anesthesia  Lidocaine jelly 2%    Antibiotic prophylaxis   None    Procedure  The patient was placed in the supineposition, was prepped and draped in the usual manner using sterile technique, and 2% lidocaine jelly instilled into the urethra  A 17 F flexible cystoscope was then inserted into the urethra and the urethra and bladder carefully examined  The following findings were noted:    Findings:  Urethra:  bryan  Prostate:  Trilobar hypertrophy, highly obstructed  Bladder:  Bladder diverticulum, os at the right bladder dome, normal mucosa inside of the tic  Ureteral orifices:  normal  Other findings:  Nipple valve prostate on urinary retention    Specimens: None                 Complications:    None; patient tolerated the procedure well           Disposition: To home after 30 minute observation  Condition: Stable    Plan: High grade obstruction and bladder diverticulum noted, no stones, no tumors                  Marisel Christy MD  1/11/2023  3:41 PM  Sign when Signing Visit       Problem List Items Addressed This Visit        Genitourinary    Stage 3a chronic kidney disease ([facility] [address] )    Acute urinary retention - Primary    Relevant Orders    Case request operating room: TRANSURETHRAL RESECTION OF PROSTATE (TURP) (Completed)    Bladder diverticulum       Other    Person consulting for explanation of examination or test finding   Other Visit Diagnoses     BPH with obstruction/lower urinary tract symptoms        Relevant Orders    PSA Total, Diagnostic    Case request operating room: TRANSURETHRAL RESECTION OF PROSTATE (TURP) (Completed)            Discussion:      Thomas Troy and his female  and I had a productive consultation today  He has been having recurrent urinary tract infections, trouble urinating, and is seen to have a bladder diverticulum along with acute urinary retention  He has been performing clean intermittent catheterization obtaining volumes between 500 mL and 800 mL  His cystoscopic work-up today shows a highly obstructive prostate which measures 22 g  He is seen to have some nipple valve effect of the bladder neck  He did inquire as to UroLift but given his anatomy I do not believe this to be the best option for him  He does have a history of some slightly elevated PSA, likely due to his urinary retention  I have requested repeat testing  If this is wildly elevated I will consider a prostate biopsy to rule out clinically significant prostate cancer as indicated based on his PSA value  Should he have clinically significant prostate cancer consideration can be given to a concomitant bladder diverticulectomy as well as a robot-assisted laparoscopic radical prostatectomy however bladder diverticulectomy is often quite difficult due to inflammation about the bladder diverticulum  Given his current findings and his wish to eventually be off of intermittent catheterization I have recommended a staged approach with transurethral resection of prostate to be followed by potential diverticulectomy in the future should he have ongoing issues after fixing his bladder outlet      He does have a history of coronary artery disease status post a coronary artery bypass grafting procedure many years ago, he did undergo coronary stenting following that and does take some cardiac medications for preventative purposes  He will require medical clearance in preparation for surgery  Otherwise denies easy bruising and bleeding  Good performance status at this time  No recent heart attack or stroke  I discussed with him the pre-, pily-, postoperative care for transurethral resection of prostate including the risks of infection, bleeding, pain, damage to surrounding structures, need for additional procedures, risk of failure of the procedure, risk of anesthesia, risk of positioning complications including neurapraxia, chronic pain, and paralysis as well as risk of rhabdomyolysis and compartment syndrome  Risk of deep venous thrombosis and venous thromboembolism as well as pneumonia and other potential unpredictable complications were also discussed with the patient  He is aware that he will have some changes in sexual ejaculatory function potentially after surgery as well as the risk of bladder neck contracture or urethral stricture  He will continue his clean intermittent catheterization in the meantime  All questions and concerns answered and addressed  He has participated in the shared/informed decision making model regarding urologic surgery with risk and wishes to proceed            Assessment and plan:       Please see problem oriented charting for the assessment plan of today's urological complaints    Rory Johnson MD      Chief Complaint     Chief Complaint   Patient presents with   • Cystoscopy         History of Present Illness     Khoa Miller is a 81 y o  man with urinary tract infection and urinary retention and bladder thickening  Extensive discussion      The following portions of the patient's history were reviewed and updated as appropriate: allergies, current medications, past family history, past medical history, past social history, past surgical history and problem list     Detailed Urologic History     - please refer to HPI    Review of Systems     Review of Systems   Constitutional: Negative  HENT: Negative  Eyes: Negative  Respiratory: Negative  Cardiovascular: Negative  Gastrointestinal: Negative  Endocrine: Negative  Genitourinary: Positive for difficulty urinating  Musculoskeletal: Negative  Skin: Negative  Allergic/Immunologic: Negative  Neurological: Negative  Hematological: Negative  Psychiatric/Behavioral: Negative  Allergies     Allergies   Allergen Reactions   • Dulcolax [Bisacodyl] Hives   • Latex    • Levofloxacin Hives       Physical Exam     Physical Exam  Vitals reviewed  Constitutional:       General: He is not in acute distress  Appearance: Normal appearance  He is not ill-appearing, toxic-appearing or diaphoretic  HENT:      Head: Normocephalic and atraumatic  Eyes:      General: No scleral icterus  Right eye: No discharge  Left eye: No discharge  Cardiovascular:      Pulses: Normal pulses  Pulmonary:      Effort: Pulmonary effort is normal    Abdominal:      Palpations: There is no mass  Genitourinary:     Penis: Normal     Musculoskeletal:         General: No swelling  Skin:     Coloration: Skin is not jaundiced  Neurological:      General: No focal deficit present  Mental Status: He is alert  Cranial Nerves: No cranial nerve deficit  Psychiatric:         Mood and Affect: Mood normal          Behavior: Behavior normal          Thought Content:  Thought content normal          Judgment: Judgment normal              Vital Signs  Vitals:    01/11/23 1458   BP: 150/80   BP Location: Left arm   Patient Position: Sitting   Cuff Size: Standard   Pulse: 59   SpO2: 96%   Weight: 85 1 kg (187 lb 9 6 oz)   Height: 5' 6" (1 676 m)         Current Medications       Current Outpatient Medications:   •  alfuzosin (UROXATRAL) 10 mg 24 hr tablet, Take 10 mg by mouth daily Take 1 tab by mouth daily after same meal each day, Disp: , Rfl:   •  aspirin (ECOTRIN LOW STRENGTH) 81 mg EC tablet, Take 81 mg by mouth daily, Disp: , Rfl:   •  carvedilol (COREG) 6 25 mg tablet, 3 125 mg, Disp: , Rfl:   •  Catheters (Magic3 Go Intermittent Cath) MISC, Insert into the urethra 2 (two) times a day 14 or 16 [facility] as available, Disp: 30 each, Rfl: 2  •  finasteride (PROSCAR) 5 mg tablet, Take 1 tablet (5 mg total) by mouth daily, Disp: 90 tablet, Rfl: 0  •  niacin (SLO-NIACIN) 500 mg tablet, 1,000 mg, Disp: , Rfl:   •  pravastatin (PRAVACHOL) 40 mg tablet, 60 mg, Disp: , Rfl:       Active Problems     Patient Active Problem List   Diagnosis   • Stage 3a chronic kidney disease ([facility] [address] )   • HTN (hypertension)   • Coronary artery disease involving native coronary artery of native heart without angina pectoris   • Acute urinary retention   • BPH (benign prostatic hyperplasia)   • HLD (hyperlipidemia)   • Bladder diverticulum   • Person consulting for explanation of examination or test finding         Past Medical History     Past Medical History:   Diagnosis Date   • BPH (benign prostatic hyperplasia)    • Hx of CABG    • Hypercholesterolemia    • Hypertension          Surgical History     Past Surgical History:   Procedure Laterality Date   • CORONARY ARTERY BYPASS GRAFT           Family History     History reviewed  No pertinent family history        Social History     Social History     Social History     Tobacco Use   Smoking Status Former   • Types: Cigarettes   Smokeless Tobacco Never         Pertinent Lab Values     Lab Results   Component Value Date    CREATININE 1 08 01/04/2023       No results found for: PSA          Pertinent Imaging      realtime review of TRUS

## 2023-01-11 NOTE — PROGRESS NOTES
Office TRUS     Indication    urinary retention    Informed consent   The risks, benefits and alternatives to TRUS discussed with patient, consent obtained      Transrectal ultrasonography  The patient was placed in the left lateral decubitus position  After an attentive digital rectal examination, a 7 5 mHz sidefire ultrasound probe was gently inserted into the rectum and biplanar imaging of the prostate was done with the findings noted below  Images were taken of any abnormal findings and also to document prostate size  Bladder  The bladder base appeared normal     Prostate    Ultrasound size measurements:  -Volume:  22 44 cm3    Ultrasound findings:  -Cysts: None  -Masses: None  -Median lobe: present                    Complications  There were no procedural complications  Disposition  The patient was dismissed to home     Post-procedure instructions: Today he underwent an uncomplicated transrectal ultrasound showing a nipple valve prostate and a 22 44 gram gland          Biopsy prostate     Date/Time 1/11/2023 3:45 PM     Performed by  Trisha Freeman MD     Authorized by Trisha Freeman MD      Universal Protocol   Consent: Verbal consent obtained  Written consent obtained  Risks and benefits: risks, benefits and alternatives were discussed  Consent given by: patient  Patient understanding: patient states understanding of the procedure being performed  Patient consent: the patient's understanding of the procedure matches consent given  Procedure consent: procedure consent matches procedure scheduled  Relevant documents: relevant documents present and verified  Test results: test results available and properly labeled  Site marked: the operative site was not marked  Radiology Images displayed and confirmed   If images not available, report reviewed: imaging studies available  Required items: required blood products, implants, devices, and special equipment available  Patient identity confirmed: verbally with patient and provided demographic data        Local anesthesia used: no     Anesthesia   Local anesthesia used: no     Sedation   Patient sedated: no        Specimen: no    Culture: no   Procedure Details   Procedure Notes: Office TRUS     Indication    urinary retention    Informed consent   The risks, benefits and alternatives to TRUS discussed with patient, consent obtained      Transrectal ultrasonography  The patient was placed in the left lateral decubitus position  After an attentive digital rectal examination, a 7 5 mHz sidefire ultrasound probe was gently inserted into the rectum and biplanar imaging of the prostate was done with the findings noted below  Images were taken of any abnormal findings and also to document prostate size  Bladder  The bladder base appeared normal     Prostate    Ultrasound size measurements:  -Volume:  22 44 cm3    Ultrasound findings:  -Cysts: None  -Masses: None  -Median lobe: present                    Complications  There were no procedural complications  Disposition  The patient was dismissed to home     Post-procedure instructions:      Today he underwent an uncomplicated transrectal ultrasound showing a nipple valve prostate and a 22 44 gram gland  Patient Transportation: confirmed  Patient tolerance: patient tolerated the procedure well with no immediate complications

## 2023-01-12 ENCOUNTER — TELEPHONE (OUTPATIENT)
Dept: UROLOGY | Facility: CLINIC | Age: 73
End: 2023-01-12

## 2023-01-12 NOTE — TELEPHONE ENCOUNTER
Spoke with Uriel Leigh at [facility] and they will be faxing a new script over (provided [facility] [place] office fax number) for us to sign  Verbal provided for them to send out a sample package to patient   Will await fax and get it signed and sent back

## 2023-01-13 ENCOUNTER — APPOINTMENT (OUTPATIENT)
Dept: LAB | Facility: AMBULARY SURGERY CENTER | Age: 73
End: 2023-01-13
Attending: UROLOGY

## 2023-01-13 DIAGNOSIS — N40.1 BPH WITH OBSTRUCTION/LOWER URINARY TRACT SYMPTOMS: ICD-10-CM

## 2023-01-13 DIAGNOSIS — N13.8 BPH WITH OBSTRUCTION/LOWER URINARY TRACT SYMPTOMS: ICD-10-CM

## 2023-01-13 LAB — PSA SERPL-MCNC: 6.2 NG/ML (ref 0–4)

## 2023-01-15 ENCOUNTER — TELEPHONE (OUTPATIENT)
Dept: UROLOGY | Facility: CLINIC | Age: 73
End: 2023-01-15

## 2023-01-15 DIAGNOSIS — R97.20 ELEVATED PSA: Primary | ICD-10-CM

## 2023-01-15 RX ORDER — SULFAMETHOXAZOLE AND TRIMETHOPRIM 800; 160 MG/1; MG/1
1 TABLET ORAL EVERY 12 HOURS SCHEDULED
Qty: 6 TABLET | Refills: 0 | Status: SHIPPED | OUTPATIENT
Start: 2023-01-15 | End: 2023-01-18

## 2023-01-15 NOTE — TELEPHONE ENCOUNTER
Please get him in with me for asap prostate biopsy, will need to be off his aspirin for a week prior (sending bactrim due to allergies, will get IM ceftriaxone as well on the day of)   PSA 6 2

## 2023-01-19 NOTE — TELEPHONE ENCOUNTER
Canceling orders for TURP, patient sched for in office Prostate Biopsy diane Pillai due to high PSA levels

## 2023-01-19 NOTE — TELEPHONE ENCOUNTER
Ne Ribeiro PA-C  [facility] Clinical 3 minutes ago (10:41 AM)     BU  Patient can use over the counter enema to evacuate stool in rectal volt as well as use the Bactrim that was sent by [facility]  Thanks       [address] [place] [address] Urology [facility] Provider 22 hours ago (11:51 AM)     Please order bx prep/ bactim already sent       Called and discussed the biopsy procedure that [facility] would like patient to have done  Went over all Scheduled appts and when to arrive  Patient confirms dates and times  Advised to stop Aspirin 7 days prior (1/25)  Advised on abx sent to pharmacy and how to take them  Advised on picking up fleets enema and when to take it  Patient verbalized understanding on all and repeated back       Advised to call office with any further concerns

## 2023-01-26 NOTE — TELEPHONE ENCOUNTER
Patient called stating he is cancelling his prostate biopsy scheduled for 02/01/23 with Dr Rosie Corona at the [facility]  He stated he will follow up with the [place]      Patient can be reached at [phone]

## 2023-01-26 NOTE — TELEPHONE ENCOUNTER
Call from patient advising he spoke with his [place] doctor who advised him to have an MRI prior to having the biopsy  [facility]  Once he has the MRI scheduled he will let the office know and have the biopsy rescheduled  FAROOQ

## 2023-02-09 DIAGNOSIS — R97.20 ELEVATED PSA: Primary | ICD-10-CM

## 2023-02-16 ENCOUNTER — HOSPITAL ENCOUNTER (OUTPATIENT)
Facility: MEDICAL CENTER | Age: 73
Discharge: HOME | End: 2023-02-16

## 2023-02-16 DIAGNOSIS — R97.20 ELEVATED PSA: ICD-10-CM

## 2023-02-16 RX ADMIN — GADOBUTROL 8 ML: 604.72 INJECTION INTRAVENOUS at 10:35

## 2023-03-07 ENCOUNTER — HOSPITAL ENCOUNTER (EMERGENCY)
Facility: HOSPITAL | Age: 73
Discharge: LEFT BEFORE DISCHARGE | End: 2023-03-07

## 2023-03-07 VITALS
OXYGEN SATURATION: 99 % | SYSTOLIC BLOOD PRESSURE: 179 MMHG | HEART RATE: 42 BPM | RESPIRATION RATE: 18 BRPM | TEMPERATURE: 98.2 F | DIASTOLIC BLOOD PRESSURE: 80 MMHG

## 2023-03-07 LAB
BACTERIA UR QL AUTO: ABNORMAL /HPF
BILIRUB UR QL STRIP: NEGATIVE
CLARITY UR: ABNORMAL
COLOR UR: YELLOW
GLUCOSE UR STRIP-MCNC: NEGATIVE MG/DL
HGB UR QL STRIP.AUTO: ABNORMAL
KETONES UR STRIP-MCNC: NEGATIVE MG/DL
LEUKOCYTE ESTERASE UR QL STRIP: ABNORMAL
NITRITE UR QL STRIP: NEGATIVE
NON-SQ EPI CELLS URNS QL MICRO: ABNORMAL /HPF
PH UR STRIP.AUTO: 5.5 [PH]
PROT UR STRIP-MCNC: ABNORMAL MG/DL
RBC #/AREA URNS AUTO: ABNORMAL /HPF
SP GR UR STRIP.AUTO: 1.01 (ref 1–1.03)
UROBILINOGEN UR STRIP-ACNC: <2 MG/DL
WBC #/AREA URNS AUTO: ABNORMAL /HPF

## 2023-03-07 NOTE — ED NOTES
This RN and RN orientee went into patient's room to get patient on cardiac monitoring for bradycardia  Pt's HR between 38 and 42 since arriving to ED room  Pt states he is always bradycardic and has a hx of cardiac issues  RN orientee informed patient that due to the bradycardia we need to have patient on cardiac leads to monitor his heart rate  Patient stated that he did not want to be seen for cardiac issues and only wanted to be seen for his UTI symptoms/abd pain  Pt then informed that we do not have to put him on cardiac monitoring and asked if he wanted to wait to see a provider for UTI symptoms and patient refused and stated he wanted to leave       Renetta Kennedy RN  03/07/23 1878

## 2023-03-08 ENCOUNTER — PATIENT MESSAGE (OUTPATIENT)
Dept: UROLOGY | Facility: CLINIC | Age: 73
End: 2023-03-08

## 2023-03-08 DIAGNOSIS — R39.9 UTI SYMPTOMS: Primary | ICD-10-CM

## 2023-03-08 NOTE — RESULT ENCOUNTER NOTE
Patient left the department without being seen  We will check sensitivities and call the patient for the need for an antibiotic

## 2023-03-09 LAB — BACTERIA UR CULT: ABNORMAL

## 2023-03-09 NOTE — RESULT ENCOUNTER NOTE
I attempted to call the patient for notification of his abnormal urine  He was never seen in the emergency room  He left without being seen

## 2023-03-10 ENCOUNTER — APPOINTMENT (OUTPATIENT)
Dept: LAB | Facility: AMBULARY SURGERY CENTER | Age: 73
End: 2023-03-10
Attending: UROLOGY

## 2023-03-10 DIAGNOSIS — R39.9 UTI SYMPTOMS: ICD-10-CM

## 2023-03-10 NOTE — RESULT ENCOUNTER NOTE
I attempted to call this patient 2 times  A registered letter will be sent for notification so he can either follow-up in the emergency room or at his [de-identified] office for treatment

## 2023-03-12 ENCOUNTER — TELEPHONE (OUTPATIENT)
Dept: UROLOGY | Facility: CLINIC | Age: 73
End: 2023-03-12

## 2023-03-12 LAB — BACTERIA UR CULT: ABNORMAL

## 2023-03-13 ENCOUNTER — TELEPHONE (OUTPATIENT)
Dept: UROLOGY | Facility: AMBULATORY SURGERY CENTER | Age: 73
End: 2023-03-13

## 2023-03-13 NOTE — TELEPHONE ENCOUNTER
Please review urine culture and advise  Unable to reach patient via phone call to triage symptoms  We will keep trying to reach him once we have recommendations from the provider

## 2023-03-13 NOTE — TELEPHONE ENCOUNTER
----- Message from Dixon Purvis MD sent at 3/12/2023  1:22 PM EDT -----    ----- Message -----  From: Lab, Background User  Sent: 3/11/2023  12:06 PM EDT  To: Dixon Purvis MD

## 2023-03-14 DIAGNOSIS — R39.9 UTI SYMPTOMS: Primary | ICD-10-CM

## 2023-03-14 RX ORDER — NITROFURANTOIN 25; 75 MG/1; MG/1
100 CAPSULE ORAL 2 TIMES DAILY
Qty: 3 CAPSULE | Refills: 0 | Status: SHIPPED | OUTPATIENT
Start: 2023-03-14 | End: 2023-03-15 | Stop reason: SDUPTHER

## 2023-03-14 NOTE — TELEPHONE ENCOUNTER
Miryam aBrclay PA-C  [facility] 59 minutes ago (7:24 AM)     BU  Antibiotics sent to pharmacy on file  Lyssa Ivy you

## 2023-03-15 DIAGNOSIS — R39.9 UTI SYMPTOMS: ICD-10-CM

## 2023-03-15 RX ORDER — NITROFURANTOIN 25; 75 MG/1; MG/1
100 CAPSULE ORAL 2 TIMES DAILY
Qty: 6 CAPSULE | Refills: 0 | Status: SHIPPED | OUTPATIENT
Start: 2023-03-15 | End: 2023-03-18

## 2023-05-12 ENCOUNTER — HOSPITAL ENCOUNTER (OUTPATIENT)
Dept: RADIOLOGY | Age: 73
Discharge: HOME | End: 2023-05-12

## 2023-05-12 DIAGNOSIS — R97.20 ELEVATED PROSTATE SPECIFIC ANTIGEN (PSA): ICD-10-CM

## 2023-05-12 DIAGNOSIS — C61 MALIGNANT NEOPLASM OF PROSTATE (HCC): ICD-10-CM

## 2023-05-12 NOTE — LETTER
[address] [place] [address]  [address] [facility] [address] [facility]      May 18, 2023    MRN: [id number]     Phone: [phone]     Dear Mr Gray Guzman recently had a(n) Nuclear Medicine performed on 5/12/2023 at  [address] [place] [address] that was requested by Agustin Randolph  The study was reviewed by a radiologist, which is a physician who specializes in medical imaging  The radiologist issued a report describing his or her findings  In that report there was a finding that the radiologist felt warranted further discussion with your health care provider and that discussion would be beneficial to you  The results were sent to Agustin Randolph on 05/15/2023  5:48 AM  We recommend that you contact Agustin Randolph at [phone] or set up an appointment to discuss the results of the imaging test  If you have already heard from Agustin Randolph regarding the results of your study, you can disregard this letter  This letter is not meant to alarm you, but intended to encourage you to follow-up on your results with the provider that sent you for the imaging study  In addition, we have enclosed answers to frequently asked questions by other patients who have also received a letter to review results with their health care provider (see page two)  Thank you for choosing [address] [place] [address] for your medical imaging needs  FREQUENTLY ASKED QUESTIONS    1  Why am I receiving this letter? [facility] requires us to notify patients who have findings on imaging exams that may require more testing or follow-up with a health professional within the next 3 months  2  How serious is the finding on the imaging test?  This letter is sent to all patients who may need follow-up or more testing within the next 3 months    Receiving this letter does not necessarily mean you have a life-threatening imaging finding or disease  Recommendations in the radiologist’s imaging report are general in nature and it is up to your healthcare provider to say whether those recommendations make sense for your situation  You are strongly encouraged to talk to your health care provider about the results and ask whether additional steps need to be taken  3  Where can I get a copy of the final report for my recent radiology exam?  To get a full copy of the report you can access your records online at [email or web address] or please contact [facility] Medical Records Department at [phone] Monday through Friday between 8 am and 6 pm          4  What do I need to do now? Please contact your health care provider who requested the imaging study to discuss what further actions (if any) are needed  You may have already heard from (your ordering provider) in regard to this test in which case you can disregard this letter  NOTICE IN ACCORDANCE WITH THE [place] STATE “PATIENT TEST RESULT INFORMATION ACT OF 2018”    You are receiving this notice as a result of a determination by your diagnostic imaging service that further discussions of your test results are warranted and would be beneficial to you  The complete results of your test or tests have been or will be sent to the health care practitioner that ordered the test or tests  It is recommended that you contact your health care practitioner to discuss your results as soon as possible

## 2023-07-06 ENCOUNTER — VBI (OUTPATIENT)
Dept: ADMINISTRATIVE | Facility: OTHER | Age: 73
End: 2023-07-06

## 2023-12-18 ENCOUNTER — VBI (OUTPATIENT)
Dept: ADMINISTRATIVE | Facility: OTHER | Age: 73
End: 2023-12-18

## 2024-01-04 ENCOUNTER — VBI (OUTPATIENT)
Dept: ADMINISTRATIVE | Facility: OTHER | Age: 74
End: 2024-01-04

## 2024-06-11 ENCOUNTER — VBI (OUTPATIENT)
Dept: ADMINISTRATIVE | Facility: OTHER | Age: 74
End: 2024-06-11

## 2024-06-11 NOTE — TELEPHONE ENCOUNTER
06/11/24 12:33 PM     [facility] [employer] SmartForm used to document caregap status.    Kimberly Arredondo

## 2024-12-18 ENCOUNTER — VBI (OUTPATIENT)
Dept: ADMINISTRATIVE | Facility: OTHER | Age: 74
End: 2024-12-18

## 2024-12-18 NOTE — TELEPHONE ENCOUNTER
12/18/24 10:33 AM     Chart reviewed for CRC: Colonoscopy was/were not submitted to the patient's insurance.     Kimberly Arredondo MA   [facility]

## 2025-05-10 ENCOUNTER — HOSPITAL ENCOUNTER (EMERGENCY)
Facility: HOSPITAL | Age: 75
Discharge: HOME | End: 2025-05-10
Attending: EMERGENCY MEDICINE
Payer: COMMERCIAL

## 2025-05-10 VITALS
HEART RATE: 62 BPM | SYSTOLIC BLOOD PRESSURE: 160 MMHG | OXYGEN SATURATION: 99 % | DIASTOLIC BLOOD PRESSURE: 100 MMHG | TEMPERATURE: 97.6 F | RESPIRATION RATE: 20 BRPM

## 2025-05-10 DIAGNOSIS — I10 HYPERTENSION, UNSPECIFIED TYPE: Primary | ICD-10-CM

## 2025-05-10 LAB
ANION GAP SERPL CALCULATED.3IONS-SCNC: 6 MMOL/L (ref 4–13)
ATRIAL RATE: 57 BPM
BASOPHILS # BLD AUTO: 0.03 THOUSANDS/ÂΜL (ref 0–0.1)
BASOPHILS NFR BLD AUTO: 1 % (ref 0–1)
BUN SERPL-MCNC: 18 MG/DL (ref 5–25)
CALCIUM SERPL-MCNC: 9.4 MG/DL (ref 8.4–10.2)
CHLORIDE SERPL-SCNC: 105 MMOL/L (ref 96–108)
CO2 SERPL-SCNC: 28 MMOL/L (ref 21–32)
CREAT SERPL-MCNC: 1.28 MG/DL (ref 0.6–1.3)
EOSINOPHIL # BLD AUTO: 0.34 THOUSAND/ÂΜL (ref 0–0.61)
EOSINOPHIL NFR BLD AUTO: 9 % (ref 0–6)
ERYTHROCYTE [DISTWIDTH] IN BLOOD BY AUTOMATED COUNT: 13 % (ref 11.6–15.1)
GFR SERPL CREATININE-BSD FRML MDRD: 55 ML/MIN/1.73SQ M
GLUCOSE SERPL-MCNC: 132 MG/DL (ref 65–140)
HCT VFR BLD AUTO: 47.9 % (ref 36.5–49.3)
HGB BLD-MCNC: 15.5 G/DL (ref 12–17)
IMM GRANULOCYTES # BLD AUTO: 0.01 THOUSAND/UL (ref 0–0.2)
IMM GRANULOCYTES NFR BLD AUTO: 0 % (ref 0–2)
LYMPHOCYTES # BLD AUTO: 1.41 THOUSANDS/ÂΜL (ref 0.6–4.47)
LYMPHOCYTES NFR BLD AUTO: 36 % (ref 14–44)
MCH RBC QN AUTO: 28.2 PG (ref 26.8–34.3)
MCHC RBC AUTO-ENTMCNC: 32.4 G/DL (ref 31.4–37.4)
MCV RBC AUTO: 87 FL (ref 82–98)
MONOCYTES # BLD AUTO: 0.39 THOUSAND/ÂΜL (ref 0.17–1.22)
MONOCYTES NFR BLD AUTO: 10 % (ref 4–12)
NEUTROPHILS # BLD AUTO: 1.74 THOUSANDS/ÂΜL (ref 1.85–7.62)
NEUTS SEG NFR BLD AUTO: 44 % (ref 43–75)
NRBC BLD AUTO-RTO: 0 /100 WBCS
P AXIS: 58 DEGREES
PLATELET # BLD AUTO: 150 THOUSANDS/UL (ref 149–390)
PMV BLD AUTO: 10.7 FL (ref 8.9–12.7)
POTASSIUM SERPL-SCNC: 4.3 MMOL/L (ref 3.5–5.3)
PR INTERVAL: 202 MS
QRS AXIS: -8 DEGREES
QRSD INTERVAL: 88 MS
QT INTERVAL: 446 MS
QTC INTERVAL: 434 MS
RBC # BLD AUTO: 5.49 MILLION/UL (ref 3.88–5.62)
SODIUM SERPL-SCNC: 139 MMOL/L (ref 135–147)
T WAVE AXIS: -47 DEGREES
VENTRICULAR RATE: 57 BPM
WBC # BLD AUTO: 3.92 THOUSAND/UL (ref 4.31–10.16)

## 2025-05-10 PROCEDURE — 99284 EMERGENCY DEPT VISIT MOD MDM: CPT | Performed by: EMERGENCY MEDICINE

## 2025-05-10 PROCEDURE — 99283 EMERGENCY DEPT VISIT LOW MDM: CPT

## 2025-05-10 PROCEDURE — 93005 ELECTROCARDIOGRAM TRACING: CPT

## 2025-05-10 PROCEDURE — 36415 COLL VENOUS BLD VENIPUNCTURE: CPT

## 2025-05-10 PROCEDURE — 93010 ELECTROCARDIOGRAM REPORT: CPT | Performed by: STUDENT IN AN ORGANIZED HEALTH CARE EDUCATION/TRAINING PROGRAM

## 2025-05-10 PROCEDURE — 80048 BASIC METABOLIC PNL TOTAL CA: CPT

## 2025-05-10 PROCEDURE — 85025 COMPLETE CBC W/AUTO DIFF WBC: CPT

## 2025-05-10 NOTE — ED ATTENDING ATTESTATION
5/10/2025  I, Jennifer Dooley MD, saw and evaluated the patient. I have discussed the patient with the resident/non-physician practitioner and agree with the resident's/non-physician practitioner's findings, Plan of Care, and MDM as documented in the resident's/non-physician practitioner's note, except where noted. All available labs and Radiology studies were reviewed.  I was present for key portions of any procedure(s) performed by the resident/non-physician practitioner and I was immediately available to provide assistance.       At this point I agree with the current assessment done in the Emergency Department.  I have conducted an independent evaluation of this patient a history and physical is as follows:    74-year-old male presents to the ER due to elevated blood pressure at home.  Patient has no other complaints.  No headache, neck pain, chest pain, shortness of breath, lightheadedness or dizziness.  No fevers.  No recent illness.  History of hypertension.  Not compliant with his medications.    No significant abnormalities on exam.    Agree with resident plan, check EKG, check kidney function and CBC    EKG unchanged from baseline, blood work within normal limits, recommend outpatient follow-up with his PCP and cardiologist.      ED Course         Critical Care Time  Procedures

## 2025-05-10 NOTE — ED PROVIDER NOTES
Time reflects when diagnosis was documented in both MDM as applicable and the Disposition within this note       Time User Action Codes Description Comment    5/10/2025 12:15 PM Jules Clinton Add [I10] Hypertension, unspecified type           ED Disposition       ED Disposition   Discharge    Condition   Stable    Date/Time   Sat May 10, 2025 12:15 PM    Comment   Audie Hermosillo discharge to home/self care.                   Assessment & Plan       Medical Decision Making  Patient with elevated blood pressure on arrival to the ED.  No clinical evidence of endorgan damage.  Most likely related to medication noncompliance with reduced dosages at home as well as acute distress from family issues.  ECG unchanged from prior.  Kidney function stable on BMP.  Recommend follow up with PCP and Cardiology as scheduled.    Amount and/or Complexity of Data Reviewed  Labs: ordered.             Medications - No data to display    ED Risk Strat Scores          No data recorded        SBIRT 20yo+      Flowsheet Row Most Recent Value   Initial Alcohol Screen: US AUDIT-C     1. How often do you have a drink containing alcohol? 0 Filed at: 05/10/2025 1114   2. How many drinks containing alcohol do you have on a typical day you are drinking?  0 Filed at: 05/10/2025 1114   3a. Male UNDER 65: How often do you have five or more drinks on one occasion? 0 Filed at: 05/10/2025 1114   3b. FEMALE Any Age, or MALE 65+: How often do you have 4 or more drinks on one occassion? 0 Filed at: 05/10/2025 1114   Audit-C Score 0 Filed at: 05/10/2025 1114   BHANU: How many times in the past year have you...    Used an illegal drug or used a prescription medication for non-medical reasons? Never Filed at: 05/10/2025 1114              History of Present Illness       Chief Complaint   Patient presents with    Hypertension     Pt took his BP at home and noted it was high       Past Medical History:   Diagnosis Date    BPH (benign prostatic hyperplasia)     Hx  of CABG     Hypercholesterolemia     Hypertension       Past Surgical History:   Procedure Laterality Date    CORONARY ARTERY BYPASS GRAFT        History reviewed. No pertinent family history.   Social History     Tobacco Use    Smoking status: Former     Types: Cigarettes    Smokeless tobacco: Never   Substance Use Topics    Alcohol use: No    Drug use: No      E-Cigarette/Vaping      E-Cigarette/Vaping Substances      I have reviewed and agree with the history as documented.     74-year-old male with past medical history of coronary artery disease s/p CABG.  Presents for 2-day history of elevated blood pressure.  Reports taking his Coreg and amlodipine at lower than prescribed doses.  He mentions the reason for this being that he is unhappy about taking higher doses of medications.  Of note had an argument with his son yesterday which has increased his stress level.  He denies headaches, vision changes, chest pain, palpitations.      Hypertension  Associated symptoms: no abdominal pain, no chest pain, no dizziness, no fatigue, no fever, no headaches, no hematuria, no nausea, no palpitations, no shortness of breath, not vomiting and no weakness        Review of Systems   Constitutional:  Negative for appetite change, fatigue and fever.   Respiratory:  Negative for cough, shortness of breath and wheezing.    Cardiovascular:  Negative for chest pain, palpitations and leg swelling.   Gastrointestinal:  Negative for abdominal distention, abdominal pain, constipation, diarrhea, nausea and vomiting.   Genitourinary:  Negative for dysuria, frequency, hematuria and urgency.   Musculoskeletal:  Negative for arthralgias and back pain.   Skin:  Negative for rash.   Neurological:  Negative for dizziness, weakness, light-headedness and headaches.           Objective       ED Triage Vitals [05/10/25 1109]   Temperature Pulse Blood Pressure Respirations SpO2 Patient Position - Orthostatic VS   97.6 °F (36.4 °C) 62 (!) 175/113 20  99 % Sitting      Temp Source Heart Rate Source BP Location FiO2 (%) Pain Score    Oral Monitor Left arm -- No Pain      Vitals      Date and Time Temp Pulse SpO2 Resp BP Pain Score FACES Pain Rating User   05/10/25 1231 -- -- -- -- 160/100 -- -- AT   05/10/25 1109 97.6 °F (36.4 °C) 62 99 % 20 175/113 No Pain -- DO            Physical Exam  Constitutional:       Appearance: Normal appearance.   HENT:      Head: Normocephalic and atraumatic.   Eyes:      Extraocular Movements: Extraocular movements intact.      Conjunctiva/sclera: Conjunctivae normal.      Pupils: Pupils are equal, round, and reactive to light.   Cardiovascular:      Rate and Rhythm: Normal rate and regular rhythm.      Pulses: Normal pulses.      Heart sounds: Normal heart sounds. No murmur heard.     No gallop.   Pulmonary:      Effort: Pulmonary effort is normal. No respiratory distress.      Breath sounds: Normal breath sounds. No wheezing.   Abdominal:      General: Bowel sounds are normal. There is no distension.      Palpations: Abdomen is soft.      Tenderness: There is no abdominal tenderness.   Skin:     Capillary Refill: Capillary refill takes less than 2 seconds.   Neurological:      General: No focal deficit present.      Mental Status: He is alert and oriented to person, place, and time. Mental status is at baseline.   Psychiatric:         Mood and Affect: Mood normal.         Behavior: Behavior normal.         Results Reviewed       Procedure Component Value Units Date/Time    Basic metabolic panel [[id number]] Collected: 05/10/25 1213    Lab Status: Final result Specimen: Blood from Arm, Left Updated: 05/10/25 1236     Sodium 139 mmol/L      Potassium 4.3 mmol/L      Chloride 105 mmol/L      CO2 28 mmol/L      ANION GAP 6 mmol/L      BUN 18 mg/dL      Creatinine 1.28 mg/dL      Glucose 132 mg/dL      Calcium 9.4 mg/dL      eGFR 55 ml/min/1.73sq m     Narrative:      National Kidney Disease Foundation guidelines for Chronic Kidney  Disease (CKD):     Stage 1 with normal or high GFR (GFR > 90 mL/min/1.73 square meters)    Stage 2 Mild CKD (GFR = 60-89 mL/min/1.73 square meters)    Stage 3A Moderate CKD (GFR = 45-59 mL/min/1.73 square meters)    Stage 3B Moderate CKD (GFR = 30-44 mL/min/1.73 square meters)    Stage 4 Severe CKD (GFR = 15-29 mL/min/1.73 square meters)    Stage 5 End Stage CKD (GFR <15 mL/min/1.73 square meters)  Note: GFR calculation is accurate only with a steady state creatinine    CBC and differential [[id number]]  (Abnormal) Collected: 05/10/25 1213    Lab Status: Final result Specimen: Blood from Arm, Left Updated: 05/10/25 1220     WBC 3.92 Thousand/uL      RBC 5.49 Million/uL      Hemoglobin 15.5 g/dL      Hematocrit 47.9 %      MCV 87 fL      MCH 28.2 pg      MCHC 32.4 g/dL      RDW 13.0 %      MPV 10.7 fL      Platelets 150 Thousands/uL      nRBC 0 /100 WBCs      Segmented % 44 %      Immature Grans % 0 %      Lymphocytes % 36 %      Monocytes % 10 %      Eosinophils Relative 9 %      Basophils Relative 1 %      Absolute Neutrophils 1.74 Thousands/µL      Absolute Immature Grans 0.01 Thousand/uL      Absolute Lymphocytes 1.41 Thousands/µL      Absolute Monocytes 0.39 Thousand/µL      Eosinophils Absolute 0.34 Thousand/µL      Basophils Absolute 0.03 Thousands/µL             No orders to display       ECG 12 Lead Documentation Only    Date/Time: 5/10/2025 12:25 PM    Performed by: Jules Clinton MD  Authorized by: Jennifer Dooley MD    ECG reviewed by me, the ED Provider: yes    Patient location:  ED  Previous ECG:     Previous ECG:  Compared to current    Similarity:  No change    Comparison to cardiac monitor: No    Interpretation:     Interpretation: abnormal    Rate:     ECG rate assessment: bradycardic    Rhythm:     Rhythm: sinus rhythm    Ectopy:     Ectopy: none    QRS:     QRS axis:  Normal  Conduction:     Conduction: normal    ST segments:     ST segments:  Normal  T waves:     T waves: non-specific    Comments:       In comparison with ECG of 2025, no significant interval changes.      ED Medication and Procedure Management   Prior to Admission Medications   Prescriptions Last Dose Informant Patient Reported? Taking?   Catheters (Magic3 Go Intermittent Cath) MISC  Self No No   Sig: Insert into the urethra 2 (two) times a day 14 or 16 Bulgarian as available   alfuzosin (UROXATRAL) 10 mg 24 hr tablet  Self Yes No   Sig: Take 10 mg by mouth daily Take 1 tab by mouth daily after same meal each day   aspirin (ECOTRIN LOW STRENGTH) 81 mg EC tablet  Self Yes No   Sig: Take 81 mg by mouth daily   carvedilol (COREG) 6.25 mg tablet  Self Yes No   Sig: 3.125 mg   finasteride (PROSCAR) 5 mg tablet  Self No No   Sig: Take 1 tablet (5 mg total) by mouth daily   niacin (SLO-NIACIN) 500 mg tablet  Self Yes No   Si,000 mg   pravastatin (PRAVACHOL) 40 mg tablet  Self Yes No   Si mg      Facility-Administered Medications: None     Patient's Medications   Discharge Prescriptions    No medications on file     No discharge procedures on file.  ED SEPSIS DOCUMENTATION   Time reflects when diagnosis was documented in both MDM as applicable and the Disposition within this note       Time User Action Codes Description Comment    5/10/2025 12:15 PM Jules Clinton Add [I10] Hypertension, unspecified type                  Jules Clinton MD  05/10/25 7931

## 2025-05-10 NOTE — DISCHARGE INSTRUCTIONS
Dear Audie Hermosillo,     [place] Medication Adjustments -   Please take carvedilol and amlodipine at the dosages prescribed.  Testing Required  -   None  Other Instructions -   Please take all your medications regularly as directed.  Please call your PCP and Cardiologist on Monday.      Sincerely,   Jules Clinton MD

## 2025-05-14 ENCOUNTER — VBI (OUTPATIENT)
Dept: ADMINISTRATIVE | Facility: OTHER | Age: 75
End: 2025-05-14

## 2025-05-14 NOTE — TELEPHONE ENCOUNTER
05/14/25 9:17 AM     Chart reviewed for CRC: Colonoscopy was/were not submitted to the patient's insurance.     Kimberly Arredondo MA   [facility]